# Patient Record
Sex: FEMALE | Race: WHITE | NOT HISPANIC OR LATINO | Employment: PART TIME | URBAN - METROPOLITAN AREA
[De-identification: names, ages, dates, MRNs, and addresses within clinical notes are randomized per-mention and may not be internally consistent; named-entity substitution may affect disease eponyms.]

---

## 2017-01-17 ENCOUNTER — RADIANT APPOINTMENT (OUTPATIENT)
Dept: GENERAL RADIOLOGY | Facility: CLINIC | Age: 67
End: 2017-01-17
Attending: PHYSICIAN ASSISTANT
Payer: COMMERCIAL

## 2017-01-17 ENCOUNTER — OFFICE VISIT (OUTPATIENT)
Dept: FAMILY MEDICINE | Facility: CLINIC | Age: 67
End: 2017-01-17
Payer: COMMERCIAL

## 2017-01-17 VITALS
HEIGHT: 67 IN | OXYGEN SATURATION: 97 % | HEART RATE: 91 BPM | TEMPERATURE: 98.4 F | SYSTOLIC BLOOD PRESSURE: 118 MMHG | BODY MASS INDEX: 21.82 KG/M2 | DIASTOLIC BLOOD PRESSURE: 64 MMHG | WEIGHT: 139 LBS

## 2017-01-17 DIAGNOSIS — M06.9 RHEUMATOID ARTHRITIS, UNSPECIFIED (H): ICD-10-CM

## 2017-01-17 DIAGNOSIS — R05.9 COUGH: ICD-10-CM

## 2017-01-17 DIAGNOSIS — J01.90 ACUTE SINUSITIS WITH SYMPTOMS > 10 DAYS: Primary | ICD-10-CM

## 2017-01-17 DIAGNOSIS — Z79.899 LONG TERM CURRENT USE OF IMMUNOSUPPRESSIVE DRUG: ICD-10-CM

## 2017-01-17 PROCEDURE — 99214 OFFICE O/P EST MOD 30 MIN: CPT | Performed by: PHYSICIAN ASSISTANT

## 2017-01-17 PROCEDURE — 71020 XR CHEST 2 VW: CPT

## 2017-01-17 NOTE — MR AVS SNAPSHOT
After Visit Summary   1/17/2017    Jannette Parra    MRN: [id number]           Patient Information     Date Of Birth          1950        Visit Information        Provider Department      1/17/2017 2:40 PM Anisa Mcgregor PA-C [facility]        Today's Diagnoses     Cough    -  1     Acute sinusitis with symptoms > 10 days           Care Instructions    Unclear exact origin and no evidence of pneumonia on exam.  Given duration of congestion sx and underlying immunosuppressive medications will treat with abx therapy.  Pt to review augmentin with rheumatology given potential interaction with methotrexate.  Re-check if not improving.    Electronically Signed By: Anisa Mcgregor PA-C          Follow-ups after your visit        Who to contact     If you have questions or need follow up information about today's clinic visit or your schedule please contact [facility] directly at [phone].  Normal or non-critical lab and imaging results will be communicated to you by MyChart, letter or phone within 4 business days after the clinic has received the results. If you do not hear from us within 7 days, please contact the clinic through [employer] or phone. If you have a critical or abnormal lab result, we will notify you by phone as soon as possible.  Submit refill requests through [employer] or call your pharmacy and they will forward the refill request to us. Please allow 3 business days for your refill to be completed.          Additional Information About Your Visit        MyChart Information     [employer] gives you secure access to your electronic health record. If you see a primary care provider, you can also send messages to your care team and make appointments. If you have questions, please call your primary care clinic.  If you do not have a primary care provider, please call [phone] and they will assist you.        Care EveryWhere ID     This is your Care  "EveryWhere ID. This could be used by other organizations to access your [facility] medical records  [id number]        Your Vitals Were     Pulse Temperature Height BMI (Body Mass Index) Pulse Oximetry Breastfeeding?    91 98.4  F (36.9  C) (Oral) 5' 7\" (1.702 m) 21.77 kg/m2 97% No       Blood Pressure from Last 3 Encounters:   01/17/17 118/64   09/27/16 132/78   02/03/15 106/57    Weight from Last 3 Encounters:   01/17/17 139 lb (63.05 kg)   09/27/16 136 lb (61.689 kg)   02/03/15 130 lb (58.968 kg)              We Performed the Following     XR Chest 2 Views          Today's Medication Changes          These changes are accurate as of: 1/17/17  3:27 PM.  If you have any questions, ask your nurse or doctor.               Start taking these medicines.        Dose/Directions    amoxicillin-clavulanate 875-125 MG per tablet   Commonly known as:  AUGMENTIN   Used for:  Acute sinusitis with symptoms > 10 days   Started by:  Anisa Mcgregor PA-C        Dose:  1 tablet   Take 1 tablet by mouth 2 times daily   Quantity:  20 tablet   Refills:  0         These medicines have changed or have updated prescriptions.        Dose/Directions    acetaminophen-codeine 300-30 MG per tablet   Commonly known as:  TYLENOL w/CODEINE No. 3   This may have changed:  Another medication with the same name was removed. Continue taking this medication, and follow the directions you see here.   Used for:  Back muscle spasm   Changed by:  Kalin Miller MD        One 3 times daily if needed for back pains   Quantity:  60 tablet   Refills:  0         Stop taking these medicines if you haven't already. Please contact your care team if you have questions.     clonazePAM 0.5 MG tablet   Commonly known as:  klonoPIN   Stopped by:  Anisa Mcgregor PA-C                Where to get your medicines      These medications were sent to [facility] [id number] [place] [address] " [place] [address]     Phone:  [phone]    - amoxicillin-clavulanate 875-125 MG per tablet             Primary Care Provider    [facility]       No address on file        Thank you!     Thank you for choosing [facility]  for your care. Our goal is always to provide you with excellent care. Hearing back from our patients is one way we can continue to improve our services. Please take a few minutes to complete the written survey that you may receive in the mail after your visit with us. Thank you!             Your Updated Medication List - Protect others around you: Learn how to safely use, store and throw away your medicines at www.disposemymeds.org.          This list is accurate as of: 1/17/17  3:27 PM.  Always use your most recent med list.                   Brand Name Dispense Instructions for use    acetaminophen-codeine 300-30 MG per tablet    TYLENOL w/CODEINE No. 3    60 tablet    One 3 times daily if needed for back pains       amoxicillin-clavulanate 875-125 MG per tablet    AUGMENTIN    20 tablet    Take 1 tablet by mouth 2 times daily       CALCIUM 500 + D 500-200 MG-IU Tabs      1 tablet daily       Cranberry 1000 MG Caps          denosumab 60 MG/ML Soln injection    PROLIA    1 mL    Inject 1 mL (60 mg) Subcutaneous once       DYAZIDE 37.5-25 MG per capsule   Generic drug:  triamterene-hydrochlorothiazide      1 CAP PO QD (Once per day)       fish oil-omega-3 fatty acids 1000 MG capsule      2 caps daily       folic acid 1 MG tablet    FOLVITE     1 TABLET DAILY       gabapentin 400 MG capsule    NEURONTIN    90 capsule    Take by mouth 2 times daily       ibuprofen 800 MG tablet    ADVIL/MOTRIN    90 tablet    Take 1 tablet by mouth every 8 hours as needed for pain.       METHOTREXATE (ARTHRITIS) TABS 2.5 MG OR      10 tablets weekly (25mg)       multivitamin per tablet      1 tablet daily       multivitamin Tabs tablet      1 TABLET DAILY       REMICADE 100 MG injection    Generic drug:  inFLIXimab     18.6 mL    Inject 185.1 mg into the vein once       TIZANIDINE HCL PO      Take 4 mg by mouth 2 times daily

## 2017-01-17 NOTE — PROGRESS NOTES
SUBJECTIVE:                                                    Jannette Parra is a 67 year old female who presents to clinic today for the following health issues:      Acute Illness   Acute illness concerns: runny nose, cough, low grade fever at onset, but not since, fatigue  Hx of RA and takes multiple immunosuppressive medications with remicade, methotrexate   Maybe a little HA that comes and goes, but not constant.  Maybe some sinus pressure with pushing, but not bad. No caryn face pain or teeth.  No painful breathing.   Cough productive, but feels its drainage from her nose.  Her rheumatologist did stop her remicade or methotrexate last week to see if this would help, but did take methrotrexate last night again.  Non-smoker. No hx of pneumonia.    Onset: for about three weeks     Fever: no    Chills/Sweats: no    Headache (location?): no    Sinus Pressure:no    Conjunctivitis:  no    Ear Pain: no    Rhinorrhea: YES    Congestion: YES    Sore Throat: no     Cough: YES    Wheeze: no    Decreased Appetite: no    Nausea: no    Vomiting: no    Diarrhea:  no    Dysuria/Freq.: no    Fatigue/Achiness: YES    Sick/Strep Exposure: no     Therapies Tried and outcome: OTC cough/cold meds, using neti-pot    Problem list and histories reviewed & adjusted, as indicated.  Additional history: as documented  Problem list, Medication list, Allergies, and Medical/Social/Surgical histories reviewed in [facility] and updated as appropriate.    Current Outpatient Prescriptions   Medication     TIZANIDINE HCL PO     amoxicillin-clavulanate (AUGMENTIN) 875-125 MG per tablet     gabapentin (NEURONTIN) 400 MG capsule     inFLIXimab (REMICADE) 100 MG injection     denosumab (PROLIA) 60 MG/ML SOLN     acetaminophen-codeine (TYLENOL W/CODEINE NO. 3) 300-30 MG per tablet     Cranberry 1000 MG CAPS     ibuprofen (ADVIL,MOTRIN) 800 MG tablet     FISH OIL 1000 MG OR CAPS     FOLIC ACID 1 MG OR TABS     METHOTREXATE (ARTHRITIS) TABS 2.5 MG OR      "DYAZIDE 37.5-25 MG OR CAPS     MULTIVITAMINS OR TABS     OCUVITE OR TABS     CALCIUM 500 + D 500-200 MG-IU OR TABS     No current facility-administered medications for this visit.     Allergies   Allergen Reactions     No Known Drug Allergies      O:  Blood pressure 118/64, pulse 91, temperature 98.4  F (36.9  C), temperature source Oral, height 5' 7\" (1.702 m), weight 139 lb (63.05 kg), SpO2 97 %, not currently breastfeeding.  Constitutional: Pt is a healthy appearing female, in no distress.  Eyes: Conjunctiva clear.  Ears: External ears and TMs normal BL.  Nose: Septum midline, nasal mucosa congested. Reports sinus pressure, but not significant tenderness to tapping.  Mouth / Throat: Normal dentition.  No oral lesions. Pharynx non erythematous, tonsils without hypertrophy.  Neck: Supple, no enlarged LN, trachea midline.  Heart: Normal S1 and S2, RRR, no appreciable murmurs, rubs, or gallops.  Lungs: CTA bilaterally with harsh breath sounds light expiratory rhonchi on the L only. No appreciable rales.    CXR personal reading shows no acute infiltrated. Does have barrel chest and hyperinflation, but pt was a non-smoker. Second - hand smoke exposure from parents until age 19, but not otherwise. Unclear if her osteoporosis makes this appear more marked too. Reviewed with her that this can be findings suggestive of COPD and we could consider further work-up with persistent breathing/cough complaints.    A/P:    ICD-10-CM    1. Acute sinusitis with symptoms > 10 days J01.90 amoxicillin-clavulanate (AUGMENTIN) 875-125 MG per tablet   2. Cough R05 XR Chest 2 Views   3. Rheumatoid arthritis, unspecified (H) M06.9    4. Long term current use of immunosuppressive drug Z79.899    ? Viral, but given duration of congestion and sinus pressure will tx for potential bacterial sinusitis.  Additionally hx complicated by use of immunosuppressive medications.  Pt in agreement with plan and will touch base with her rheumatologist " regarding discontinuing methotrexate given potential interaction with augmentin.  See pt instructions and notes in CXR personal reading above.    Patient Instructions   Unclear exact origin and no evidence of pneumonia on exam.  Given duration of congestion sx and underlying immunosuppressive medications will treat with abx therapy.  Pt to review augmentin with rheumatology given potential interaction with methotrexate.  Re-check if not improving.    Electronically Signed By: Anisa Mcgregor PA-C

## 2017-01-17 NOTE — PATIENT INSTRUCTIONS
Unclear exact origin and no evidence of pneumonia on exam.  Given duration of congestion sx and underlying immunosuppressive medications will treat with abx therapy.  Pt to review augmentin with rheumatology given potential interaction with methotrexate.  Re-check if not improving.    Electronically Signed By: Anisa Mcgregor PA-C

## 2017-01-17 NOTE — NURSING NOTE
"Chief Complaint   Patient presents with     URI    /64 mmHg  Pulse 91  Temp(Src) 98.4  F (36.9  C) (Oral)  Ht 5' 7\" (1.702 m)  Wt 139 lb (63.05 kg)  BMI 21.77 kg/m2  SpO2 97%  Breastfeeding? No Body Mass Index is Body mass index is 21.77 kg/(m^2).  BP completed using cuff size : regular right arm  Millicent Adams MA          "

## 2017-01-17 NOTE — Clinical Note
[facility]          [address] [place] [address]                                           [phone]  January 25, 2017     Jannette Parra  [address] [address]      Dear Jannette,    The results of your recent tests were reviewed and are as follows:    Final radiology report of chest x-ray was normal. Follow-up as previously discussed in clinic.    Enclosed is a copy of the results.     Thank you for choosing [facility].  We appreciate the opportunity to serve you and look forward to supporting your healthcare needs in the future.    If you have any questions or concerns, please call me or my staff at [phone].      Sincerely,    DELISA Argueta

## 2017-01-25 NOTE — PROGRESS NOTES
Quick Note:    Please call or write patient with the following results:    Final radiology report of chest x-ray was normal. Follow-up as previously discussed in clinic.    Electronically Signed By: Anisa Mcgregor PA-C    ______

## 2017-02-28 ENCOUNTER — TRANSFERRED RECORDS (OUTPATIENT)
Dept: HEALTH INFORMATION MANAGEMENT | Facility: CLINIC | Age: 67
End: 2017-02-28

## 2017-02-28 LAB
ALT SERPL-CCNC: 21 IU/L (ref 5–35)
AST SERPL-CCNC: 22 U/L (ref 5–34)
CREAT SERPL-MCNC: 0.62 MG/DL (ref 0.5–1.3)
GFR SERPL CREATININE-BSD FRML MDRD: 102.4 ML/MIN/1.73M2

## 2017-06-05 ENCOUNTER — TRANSFERRED RECORDS (OUTPATIENT)
Dept: HEALTH INFORMATION MANAGEMENT | Facility: CLINIC | Age: 67
End: 2017-06-05

## 2017-06-05 LAB
ALT SERPL-CCNC: 28 IU/L (ref 5–35)
AST SERPL-CCNC: 31 U/L (ref 5–34)
CREAT SERPL-MCNC: 0.75 MG/DL (ref 0.5–1.3)
GFR SERPL CREATININE-BSD FRML MDRD: 82.2 ML/MIN/1.73M2

## 2017-09-11 ENCOUNTER — TRANSFERRED RECORDS (OUTPATIENT)
Dept: HEALTH INFORMATION MANAGEMENT | Facility: CLINIC | Age: 67
End: 2017-09-11

## 2017-09-11 LAB
ALT SERPL-CCNC: 25 IU/L (ref 5–35)
AST SERPL-CCNC: 29 U/L (ref 5–34)
CREAT SERPL-MCNC: 0.52 MG/DL (ref 0.5–1.3)
GFR SERPL CREATININE-BSD FRML MDRD: 125.4 ML/MIN/1.73M2

## 2017-12-11 ENCOUNTER — TRANSFERRED RECORDS (OUTPATIENT)
Dept: HEALTH INFORMATION MANAGEMENT | Facility: CLINIC | Age: 67
End: 2017-12-11

## 2017-12-11 LAB
ALT SERPL-CCNC: 22 IU/L (ref 5–35)
AST SERPL-CCNC: 24 U/L (ref 5–34)
CREAT SERPL-MCNC: 0.58 MG/DL (ref 0.5–1.3)
GFR SERPL CREATININE-BSD FRML MDRD: 110.5 ML/MIN/1.73M2

## 2018-03-12 ENCOUNTER — TRANSFERRED RECORDS (OUTPATIENT)
Dept: HEALTH INFORMATION MANAGEMENT | Facility: CLINIC | Age: 68
End: 2018-03-12

## 2018-03-12 LAB
ALT SERPL-CCNC: 20 IU/L (ref 5–35)
AST SERPL-CCNC: 24 U/L (ref 5–34)
CREAT SERPL-MCNC: 0.61 MG/DL (ref 0.5–1.3)
GFR SERPL CREATININE-BSD FRML MDRD: 104 ML/MIN/1.73M2

## 2018-06-05 ENCOUNTER — TRANSFERRED RECORDS (OUTPATIENT)
Dept: HEALTH INFORMATION MANAGEMENT | Facility: CLINIC | Age: 68
End: 2018-06-05

## 2018-06-05 LAB
ALT SERPL-CCNC: 35 IU/L (ref 5–35)
AST SERPL-CCNC: 35 U/L (ref 5–34)
CREAT SERPL-MCNC: 0.55 MG/DL (ref 0.5–1.3)
GFR SERPL CREATININE-BSD FRML MDRD: 117.2 ML/MIN/1.73M2

## 2018-09-10 ENCOUNTER — TRANSFERRED RECORDS (OUTPATIENT)
Dept: HEALTH INFORMATION MANAGEMENT | Facility: CLINIC | Age: 68
End: 2018-09-10

## 2018-12-10 ENCOUNTER — TRANSFERRED RECORDS (OUTPATIENT)
Dept: HEALTH INFORMATION MANAGEMENT | Facility: CLINIC | Age: 68
End: 2018-12-10

## 2019-03-12 ENCOUNTER — TRANSFERRED RECORDS (OUTPATIENT)
Dept: HEALTH INFORMATION MANAGEMENT | Facility: CLINIC | Age: 69
End: 2019-03-12

## 2019-06-11 ENCOUNTER — TRANSFERRED RECORDS (OUTPATIENT)
Dept: HEALTH INFORMATION MANAGEMENT | Facility: CLINIC | Age: 69
End: 2019-06-11

## 2019-09-10 ENCOUNTER — TRANSFERRED RECORDS (OUTPATIENT)
Dept: HEALTH INFORMATION MANAGEMENT | Facility: CLINIC | Age: 69
End: 2019-09-10

## 2019-11-15 ENCOUNTER — TRANSFERRED RECORDS (OUTPATIENT)
Dept: HEALTH INFORMATION MANAGEMENT | Facility: CLINIC | Age: 69
End: 2019-11-15

## 2022-08-14 ENCOUNTER — APPOINTMENT (OUTPATIENT)
Dept: GENERAL RADIOLOGY | Facility: CLINIC | Age: 72
DRG: 481 | End: 2022-08-14
Attending: EMERGENCY MEDICINE
Payer: MEDICARE

## 2022-08-14 ENCOUNTER — HOSPITAL ENCOUNTER (INPATIENT)
Facility: CLINIC | Age: 72
LOS: 4 days | Discharge: NURSING, REHAB OR LONG-TERM CARE | DRG: 481 | End: 2022-08-18
Attending: EMERGENCY MEDICINE | Admitting: HOSPITALIST
Payer: MEDICARE

## 2022-08-14 DIAGNOSIS — S72.322A CLOSED DISPLACED TRANSVERSE FRACTURE OF SHAFT OF LEFT FEMUR, INITIAL ENCOUNTER (H): ICD-10-CM

## 2022-08-14 DIAGNOSIS — M06.9 RHEUMATOID ARTHRITIS, INVOLVING UNSPECIFIED SITE, UNSPECIFIED WHETHER RHEUMATOID FACTOR PRESENT (H): ICD-10-CM

## 2022-08-14 LAB
ABO/RH(D): NORMAL
ANION GAP SERPL CALCULATED.3IONS-SCNC: 8 MMOL/L (ref 7–15)
ANTIBODY SCREEN: NEGATIVE
BASOPHILS # BLD AUTO: 0 10E3/UL (ref 0–0.2)
BASOPHILS NFR BLD AUTO: 0 %
BUN SERPL-MCNC: 14.4 MG/DL (ref 8–23)
CALCIUM SERPL-MCNC: 10.5 MG/DL (ref 8.8–10.2)
CHLORIDE SERPL-SCNC: 105 MMOL/L (ref 98–107)
CREAT SERPL-MCNC: 0.62 MG/DL (ref 0.51–0.95)
DEPRECATED HCO3 PLAS-SCNC: 27 MMOL/L (ref 22–29)
EOSINOPHIL # BLD AUTO: 0.1 10E3/UL (ref 0–0.7)
EOSINOPHIL NFR BLD AUTO: 1 %
ERYTHROCYTE [DISTWIDTH] IN BLOOD BY AUTOMATED COUNT: 13.9 % (ref 10–15)
GFR SERPL CREATININE-BSD FRML MDRD: >90 ML/MIN/1.73M2
GLUCOSE SERPL-MCNC: 104 MG/DL (ref 70–99)
HCT VFR BLD AUTO: 37.6 % (ref 35–47)
HGB BLD-MCNC: 11.4 G/DL (ref 11.7–15.7)
IMM GRANULOCYTES # BLD: 0.1 10E3/UL
IMM GRANULOCYTES NFR BLD: 1 %
LYMPHOCYTES # BLD AUTO: 1.5 10E3/UL (ref 0.8–5.3)
LYMPHOCYTES NFR BLD AUTO: 16 %
MCH RBC QN AUTO: 30 PG (ref 26.5–33)
MCHC RBC AUTO-ENTMCNC: 30.3 G/DL (ref 31.5–36.5)
MCV RBC AUTO: 99 FL (ref 78–100)
MONOCYTES # BLD AUTO: 0.9 10E3/UL (ref 0–1.3)
MONOCYTES NFR BLD AUTO: 10 %
NEUTROPHILS # BLD AUTO: 6.8 10E3/UL (ref 1.6–8.3)
NEUTROPHILS NFR BLD AUTO: 72 %
NRBC # BLD AUTO: 0 10E3/UL
NRBC BLD AUTO-RTO: 0 /100
PLATELET # BLD AUTO: 254 10E3/UL (ref 150–450)
POTASSIUM SERPL-SCNC: 4.1 MMOL/L (ref 3.4–5.3)
RBC # BLD AUTO: 3.8 10E6/UL (ref 3.8–5.2)
SARS-COV-2 RNA RESP QL NAA+PROBE: NEGATIVE
SODIUM SERPL-SCNC: 140 MMOL/L (ref 136–145)
SPECIMEN EXPIRATION DATE: NORMAL
WBC # BLD AUTO: 9.4 10E3/UL (ref 4–11)

## 2022-08-14 PROCEDURE — 120N000001 HC R&B MED SURG/OB

## 2022-08-14 PROCEDURE — 96376 TX/PRO/DX INJ SAME DRUG ADON: CPT

## 2022-08-14 PROCEDURE — 72170 X-RAY EXAM OF PELVIS: CPT

## 2022-08-14 PROCEDURE — 99285 EMERGENCY DEPT VISIT HI MDM: CPT | Mod: 25

## 2022-08-14 PROCEDURE — 96375 TX/PRO/DX INJ NEW DRUG ADDON: CPT

## 2022-08-14 PROCEDURE — 85025 COMPLETE CBC W/AUTO DIFF WBC: CPT | Performed by: EMERGENCY MEDICINE

## 2022-08-14 PROCEDURE — 96361 HYDRATE IV INFUSION ADD-ON: CPT

## 2022-08-14 PROCEDURE — U0005 INFEC AGEN DETEC AMPLI PROBE: HCPCS | Performed by: EMERGENCY MEDICINE

## 2022-08-14 PROCEDURE — 250N000011 HC RX IP 250 OP 636: Performed by: EMERGENCY MEDICINE

## 2022-08-14 PROCEDURE — 258N000003 HC RX IP 258 OP 636: Performed by: HOSPITALIST

## 2022-08-14 PROCEDURE — 250N000009 HC RX 250: Performed by: EMERGENCY MEDICINE

## 2022-08-14 PROCEDURE — 86901 BLOOD TYPING SEROLOGIC RH(D): CPT | Performed by: EMERGENCY MEDICINE

## 2022-08-14 PROCEDURE — 250N000011 HC RX IP 250 OP 636: Performed by: HOSPITALIST

## 2022-08-14 PROCEDURE — 250N000013 HC RX MED GY IP 250 OP 250 PS 637: Performed by: HOSPITALIST

## 2022-08-14 PROCEDURE — 80048 BASIC METABOLIC PNL TOTAL CA: CPT | Performed by: EMERGENCY MEDICINE

## 2022-08-14 PROCEDURE — C9803 HOPD COVID-19 SPEC COLLECT: HCPCS

## 2022-08-14 PROCEDURE — 36415 COLL VENOUS BLD VENIPUNCTURE: CPT | Performed by: EMERGENCY MEDICINE

## 2022-08-14 PROCEDURE — 96374 THER/PROPH/DIAG INJ IV PUSH: CPT

## 2022-08-14 PROCEDURE — 73552 X-RAY EXAM OF FEMUR 2/>: CPT | Mod: LT

## 2022-08-14 PROCEDURE — 99223 1ST HOSP IP/OBS HIGH 75: CPT | Mod: AI | Performed by: HOSPITALIST

## 2022-08-14 RX ORDER — SULFASALAZINE 500 MG/1
1000 TABLET ORAL 2 TIMES DAILY
COMMUNITY

## 2022-08-14 RX ORDER — DULOXETIN HYDROCHLORIDE 60 MG/1
60 CAPSULE, DELAYED RELEASE ORAL EVERY EVENING
COMMUNITY

## 2022-08-14 RX ORDER — HYDROMORPHONE HYDROCHLORIDE 1 MG/ML
0.5 INJECTION, SOLUTION INTRAMUSCULAR; INTRAVENOUS; SUBCUTANEOUS
Status: DISCONTINUED | OUTPATIENT
Start: 2022-08-14 | End: 2022-08-18 | Stop reason: HOSPADM

## 2022-08-14 RX ORDER — NALOXONE HYDROCHLORIDE 0.4 MG/ML
0.4 INJECTION, SOLUTION INTRAMUSCULAR; INTRAVENOUS; SUBCUTANEOUS
Status: DISCONTINUED | OUTPATIENT
Start: 2022-08-14 | End: 2022-08-18 | Stop reason: HOSPADM

## 2022-08-14 RX ORDER — GABAPENTIN 300 MG/1
600 CAPSULE ORAL 2 TIMES DAILY
Status: DISCONTINUED | OUTPATIENT
Start: 2022-08-14 | End: 2022-08-15

## 2022-08-14 RX ORDER — ONDANSETRON 2 MG/ML
4 INJECTION INTRAMUSCULAR; INTRAVENOUS EVERY 6 HOURS PRN
Status: DISCONTINUED | OUTPATIENT
Start: 2022-08-14 | End: 2022-08-18 | Stop reason: HOSPADM

## 2022-08-14 RX ORDER — NALOXONE HYDROCHLORIDE 0.4 MG/ML
0.2 INJECTION, SOLUTION INTRAMUSCULAR; INTRAVENOUS; SUBCUTANEOUS
Status: DISCONTINUED | OUTPATIENT
Start: 2022-08-14 | End: 2022-08-18 | Stop reason: HOSPADM

## 2022-08-14 RX ORDER — LIDOCAINE 40 MG/G
CREAM TOPICAL
Status: DISCONTINUED | OUTPATIENT
Start: 2022-08-14 | End: 2022-08-18

## 2022-08-14 RX ORDER — HYDROMORPHONE HCL IN WATER/PF 6 MG/30 ML
0.2 PATIENT CONTROLLED ANALGESIA SYRINGE INTRAVENOUS
Status: DISCONTINUED | OUTPATIENT
Start: 2022-08-14 | End: 2022-08-18 | Stop reason: HOSPADM

## 2022-08-14 RX ORDER — FOLIC ACID 1 MG/1
1000 TABLET ORAL DAILY
Status: DISCONTINUED | OUTPATIENT
Start: 2022-08-14 | End: 2022-08-18 | Stop reason: HOSPADM

## 2022-08-14 RX ORDER — GABAPENTIN 300 MG/1
600 CAPSULE ORAL 2 TIMES DAILY
COMMUNITY

## 2022-08-14 RX ORDER — TRANEXAMIC ACID 10 MG/ML
1 INJECTION, SOLUTION INTRAVENOUS ONCE
Status: COMPLETED | OUTPATIENT
Start: 2022-08-14 | End: 2022-08-14

## 2022-08-14 RX ORDER — GABAPENTIN 300 MG/1
300 CAPSULE ORAL 2 TIMES DAILY
Status: DISCONTINUED | OUTPATIENT
Start: 2022-08-14 | End: 2022-08-14

## 2022-08-14 RX ORDER — MELOXICAM 15 MG/1
15 TABLET ORAL EVERY EVENING
Status: ON HOLD | COMMUNITY
End: 2022-08-18

## 2022-08-14 RX ORDER — HYDROCODONE BITARTRATE AND ACETAMINOPHEN 7.5; 325 MG/1; MG/1
1 TABLET ORAL EVERY 6 HOURS PRN
Status: DISCONTINUED | OUTPATIENT
Start: 2022-08-14 | End: 2022-08-18 | Stop reason: HOSPADM

## 2022-08-14 RX ORDER — FERROUS SULFATE 325(65) MG
325 TABLET, DELAYED RELEASE (ENTERIC COATED) ORAL EVERY OTHER DAY
COMMUNITY

## 2022-08-14 RX ORDER — SENNOSIDES 8.6 MG
1300 CAPSULE ORAL EVERY MORNING
COMMUNITY

## 2022-08-14 RX ORDER — ONDANSETRON 4 MG/1
4 TABLET, ORALLY DISINTEGRATING ORAL EVERY 6 HOURS PRN
Status: DISCONTINUED | OUTPATIENT
Start: 2022-08-14 | End: 2022-08-18 | Stop reason: HOSPADM

## 2022-08-14 RX ADMIN — HYDROMORPHONE HYDROCHLORIDE 0.5 MG: 1 INJECTION, SOLUTION INTRAMUSCULAR; INTRAVENOUS; SUBCUTANEOUS at 16:48

## 2022-08-14 RX ADMIN — FAMOTIDINE 20 MG: 10 INJECTION, SOLUTION INTRAVENOUS at 18:48

## 2022-08-14 RX ADMIN — Medication 1000 MCG: at 18:47

## 2022-08-14 RX ADMIN — TRANEXAMIC ACID 1 G: 10 INJECTION, SOLUTION INTRAVENOUS at 14:20

## 2022-08-14 RX ADMIN — HYDROMORPHONE HYDROCHLORIDE 0.2 MG: 0.2 INJECTION, SOLUTION INTRAMUSCULAR; INTRAVENOUS; SUBCUTANEOUS at 18:48

## 2022-08-14 RX ADMIN — DEXTROSE AND SODIUM CHLORIDE: 5; 900 INJECTION, SOLUTION INTRAVENOUS at 15:37

## 2022-08-14 RX ADMIN — HYDROMORPHONE HYDROCHLORIDE 0.5 MG: 1 INJECTION, SOLUTION INTRAMUSCULAR; INTRAVENOUS; SUBCUTANEOUS at 13:29

## 2022-08-14 RX ADMIN — GABAPENTIN 600 MG: 300 CAPSULE ORAL at 21:24

## 2022-08-14 RX ADMIN — HYDROCODONE BITARTRATE AND ACETAMINOPHEN 1 TABLET: 7.5; 325 TABLET ORAL at 21:33

## 2022-08-14 ASSESSMENT — ACTIVITIES OF DAILY LIVING (ADL)
ADLS_ACUITY_SCORE: 39
CHANGE_IN_FUNCTIONAL_STATUS_SINCE_ONSET_OF_CURRENT_ILLNESS/INJURY: NO
NUMBER_OF_TIMES_PATIENT_HAS_FALLEN_WITHIN_LAST_SIX_MONTHS: 1
CONCENTRATING,_REMEMBERING_OR_MAKING_DECISIONS_DIFFICULTY: NO
FALL_HISTORY_WITHIN_LAST_SIX_MONTHS: YES
ADLS_ACUITY_SCORE: 26
ADLS_ACUITY_SCORE: 39
DIFFICULTY_COMMUNICATING: NO
DOING_ERRANDS_INDEPENDENTLY_DIFFICULTY: NO
ADLS_ACUITY_SCORE: 26
WALKING_OR_CLIMBING_STAIRS_DIFFICULTY: NO
WEAR_GLASSES_OR_BLIND: NO
DIFFICULTY_EATING/SWALLOWING: NO
TOILETING_ISSUES: NO
ADLS_ACUITY_SCORE: 30
DRESSING/BATHING_DIFFICULTY: NO
ADLS_ACUITY_SCORE: 35

## 2022-08-14 ASSESSMENT — ENCOUNTER SYMPTOMS
ARTHRALGIAS: 1
HEADACHES: 0

## 2022-08-14 NOTE — ED NOTES
[facility]  ED Nurse Handoff Report    Jannette Parra is a 72 year old female   ED Chief complaint: Fall  . ED Diagnosis:   Final diagnoses:   Closed displaced transverse fracture of shaft of left femur, initial encounter (H)   Rheumatoid arthritis, involving unspecified site, unspecified whether rheumatoid factor present (H)     Allergies:   Allergies   Allergen Reactions     No Known Drug Allergies        Code Status: Full Code  Activity level - Baseline/Home:  Independent. Activity Level - Current:   Assist X 2. Lift room needed: No. Bariatric: No   Needed: No   Isolation: No. Infection: Not Applicable.     Vital Signs:   Vitals:    08/14/22 1200 08/14/22 1215 08/14/22 1230 08/14/22 1335   BP: (!) 142/71 (!) 123/90 118/75 100/66   Pulse:   73 82   Resp:    18   Temp:       SpO2: 100% 99% 96% 96%       Cardiac Rhythm:  ,      Pain level:    Patient confused: No. Patient Falls Risk: Yes.   Elimination Status: has not yet voided   Patient Report - Initial Complaint: fall, leg pain. Focused Assessment: Jannette Parra is a 72 year old female with history of RA and osteoporosis who presents via EMS with left leg pain followed by a mechanical fall. Per patient's report, she was in her garden, pulling some leaves and fell backward. She denies hitting her head or losing consciousness. No neck pain. No blood thinners. EMS notes a deformity mid shift of left femur, and placed her in traction. EMS gave her 15 mg ketamine. Her pain is at a 3/10 currently. She reports a history of RA and osteoporosis. She had a turkey sandwich at 9 am.    Tests Performed: labs, imaging. Abnormal Results:   XR Pelvis 1/2 Views   Final Result   IMPRESSION: No proximal left femoral fracture or pelvic fracture. Cannulated screw fixation across an old healed right femoral neck fracture. Traction device is in place.      XR Femur Left 2 Views   Final Result   IMPRESSION: Oblique fracture of the mid femoral diaphysis. There  is posterior displacement and proximal migration of the distal femur. No angulation. Traction device is in place.        Labs Ordered and Resulted from Time of ED Arrival to Time of ED Departure   BASIC METABOLIC PANEL - Abnormal       Result Value    Creatinine 0.62      Sodium 140      Potassium 4.1      Urea Nitrogen 14.4      Chloride 105      Carbon Dioxide (CO2) 27      Anion Gap 8      Glucose 104 (*)     GFR Estimate >90      Calcium 10.5 (*)    CBC WITH PLATELETS AND DIFFERENTIAL - Abnormal    WBC Count 9.4      RBC Count 3.80      Hemoglobin 11.4 (*)     Hematocrit 37.6      MCV 99      MCH 30.0      MCHC 30.3 (*)     RDW 13.9      Platelet Count 254      % Neutrophils 72      % Lymphocytes 16      % Monocytes 10      % Eosinophils 1      % Basophils 0      % Immature Granulocytes 1      NRBCs per 100 WBC 0      Absolute Neutrophils 6.8      Absolute Lymphocytes 1.5      Absolute Monocytes 0.9      Absolute Eosinophils 0.1      Absolute Basophils 0.0      Absolute Immature Granulocytes 0.1      Absolute NRBCs 0.0     COVID-19 VIRUS (CORONAVIRUS) BY PCR - Normal    SARS CoV2 PCR Negative     TYPE AND SCREEN, ADULT    ABO/RH(D) O POS      Antibody Screen Negative      SPECIMEN EXPIRATION DATE [id number]     ABO/RH TYPE AND SCREEN   .   Treatments provided: dilaudid. EMS administered ketamine  Family Comments: spouse in room  OBS brochure/video discussed/provided to patient:  N/A  ED Medications:   Medications   HYDROmorphone (PF) (DILAUDID) injection 0.5 mg (0.5 mg Intravenous Given 8/14/22 1329)     Drips infusing:  No  For the majority of the shift, the patient's behavior Green. Interventions performed were NA.    Sepsis treatment initiated: No     Patient tested for COVID 19 prior to admission: YES    ED Nurse Name/Phone Number: Sharon Lord RN,   1:37 PM  RECEIVING UNIT ED HANDOFF REVIEW    Above ED Nurse Handoff Report was reviewed: Yes  Reviewed by: Chrissy Good RN on August 14, 2022 at  4:30 PM

## 2022-08-14 NOTE — PROGRESS NOTES
Ortho Brief    71 yo F with displaced L midshaft femur fracture    NPO p [place]  Plan OR for IMN Monday AM
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
## 2022-08-14 NOTE — PHARMACY-ADMISSION MEDICATION HISTORY
Admission medication history interview status for this patient is complete. See [facility] admission navigator for allergy information, prior to admission medications and immunization status.     Medication history interview done, indicate source(s): Patient  Medication history resources (including written lists, pill bottles, clinic record):None    Changes made to PTA medication list:  Added: sulfasalazine, meloxicam, Areds, arthritis Tylenol, duloxetine, Reclast, ferrous sulfate  Changed: gabapentin  Reported as Not Taking: none  Removed: acetaminophen-codeine, Augmentin, calcium with vitamin D, Prolia, Dyazide, ibuprofen, Remicade, ocuvite, tizanidine    Actions taken by pharmacist (provider contacted, etc):Left note to provider that med rec is complete.      Additional medication history information:None    Medication reconciliation/reorder completed by provider prior to medication history?  Y   (Y/N)       Prior to Admission medications    Medication Sig Last Dose Taking? Auth Provider Long Term End Date   acetaminophen (TYLENOL 8 HOUR ARTHRITIS PAIN) 650 MG CR tablet Take 1,300 mg by mouth every morning 8/14/2022 at am Yes Unknown, Entered By History     CRANBERRY PO Take 1 tablet by mouth every evening 8/13/2022 at pm Yes Reported, Patient     DULoxetine (CYMBALTA) 60 MG capsule Take 60 mg by mouth every evening 8/13/2022 at pm Yes Unknown, Entered By History Yes    ferrous sulfate (FE TABS) 325 (65 Fe) MG EC tablet Take 325 mg by mouth every other day Take in the evening 8/12/2022 at pm Yes Unknown, Entered By History     FOLIC ACID 1 MG OR TABS Take 1 mg by mouth every evening 8/13/2022 at pm Yes Reported, Patient     gabapentin (NEURONTIN) 300 MG capsule Take 600 mg by mouth 2 times daily 8/14/2022 at am Yes Unknown, Entered By History No    meloxicam (MOBIC) 15 MG tablet Take 15 mg by mouth every evening 8/13/2022 at pm Yes Unknown, Entered By History Yes    METHOTREXATE (ARTHRITIS) TABS 2.5 MG OR Take 25 mg by  mouth once a week Take on Monday 8/8/2022 Yes      Multiple Vitamins-Minerals (ICAPS AREDS FORMULA PO) Take 1 capsule by mouth 2 times daily 8/14/2022 at am Yes Unknown, Entered By History     MULTIVITAMINS OR TABS Take 1 tablet by mouth daily 8/14/2022 at am Yes      Omega-3 Fatty Acids (FISH OIL PO) Take 1 capsule by mouth 2 times daily 8/14/2022 at am Yes Reported, Patient     sulfaSALAzine (AZULFIDINE) 500 MG tablet Take 1,000 mg by mouth 2 times daily 8/14/2022 at am Yes Unknown, Entered By History     Zoledronic Acid (RECLAST IV) Receive two times a year June 2022 Yes Unknown, Entered By History

## 2022-08-14 NOTE — ED TRIAGE NOTES
Pt was working in there garden, tripping and fell backwards.  No loc per EMS pt has a deformity mid shift of left femur.  She was placed in traction. She received 15 mg of Ketamine for pain.     Triage Assessment     Row Name 08/14/22 1159       Triage Assessment (Adult)    Airway WDL WDL       Respiratory WDL    Respiratory WDL WDL

## 2022-08-14 NOTE — ED NOTES
Bed: ED27  Expected date: 8/14/22  Expected time: 11:40 AM  Means of arrival: Ambulance  Comments:  [address]-  73 yo F- femur fx

## 2022-08-14 NOTE — ED PROVIDER NOTES
History   Chief Complaint:  Fall       HPI   Jannette Parra is a 72 year old female with history of RA and osteoporosis who presents via EMS with left leg pain followed by a mechanical fall. Per patient's report, she was in her garden, pulling some leaves and fell backward. She denies hitting her head or losing consciousness. No neck pain. No blood thinners. EMS notes a deformity mid shift of left femur, and placed her in traction. EMS gave her 15 mg ketamine. Her pain is at a 3/10 currently. She reports a history of RA and osteoporosis. She had a turkey sandwich at 9 am.     Review of Systems   Musculoskeletal: Positive for arthralgias.   Neurological: Negative for syncope and headaches.   All other systems reviewed and are negative.    Allergies:  No Known Drug Allergies    Medications:  amoxicillin-clavulanate (AUGMENTIN) 875-125 MG per tablet  CALCIUM 500 + D 500-200 MG-IU OR TABS  denosumab (PROLIA) 60 MG/ML SOLN  DYAZIDE 37.5-25 MG OR CAPS  gabapentin (NEURONTIN) 400 MG capsule  inFLIXimab (REMICADE) 100 MG injection  METHOTREXATE (ARTHRITIS) TABS 2.5 MG OR  OCUVITE OR TABS  TIZANIDINE HCL PO    Past Medical History:     RA  Osteoporosis  Lumbago  Chronic back pain  Stress fracture of left foot  Instability of right foot joint    Past Surgical History:    Colonoscopy   Tubal ligation  Right hip nailing    Family History:    Father: congenital defect  Sister: diabetes  Brother: diabetes    Social History:  Presents alone via EMS.  PCP: Hellen Cornelius MD      Physical Exam     Patient Vitals for the past 24 hrs:   BP Temp Pulse Resp SpO2   08/14/22 1335 100/66 -- 82 18 96 %   08/14/22 1230 118/75 -- 73 -- 96 %   08/14/22 1215 (!) 123/90 -- -- -- 99 %   08/14/22 1200 (!) 142/71 -- -- -- 100 %   08/14/22 1157 (!) 142/71 98.3  F (36.8  C) 80 16 99 %       Physical Exam  Constitutional: Alert, attentive, GCS 15   HENT:    Nose: Nose normal.    Mouth/Throat: Oropharynx is clear, mucous membranes are moist   Neck:  No midline tenderness, full range of motion  Head: No occipital or scalp trauma.  Eyes: EOM are normal, anicteric, conjugate gaze  CV: regular rate and rhythm; no murmurs  Chest: Effort normal and breath sounds clear without wheezing or rales, symmetric bilaterally   GI:  non tender. No distension. No guarding or rebound.    MSK: Left leg in hare traction, distal upper leg tenderness, no overt knee tenderness, good distal cap refill and DP/PT pulses, distal sensation intact.  Pelvis is stable, no other extremity tenderness, no back tenderness  Neurological: Alert, attentive, moving all extremities equally.   Skin: Skin is warm and dry.      Emergency Department Course     Imaging:  XR Pelvis 1/2 Views   Final Result   IMPRESSION: No proximal left femoral fracture or pelvic fracture. Cannulated screw fixation across an old healed right femoral neck fracture. Traction device is in place.      XR Femur Left 2 Views   Final Result   IMPRESSION: Oblique fracture of the mid femoral diaphysis. There is posterior displacement and proximal migration of the distal femur. No angulation. Traction device is in place.        Report per radiology    Laboratory   Labs Ordered and Resulted from Time of ED Arrival to Time of ED Departure   BASIC METABOLIC PANEL - Abnormal       Result Value    Creatinine 0.62      Sodium 140      Potassium 4.1      Urea Nitrogen 14.4      Chloride 105      Carbon Dioxide (CO2) 27      Anion Gap 8      Glucose 104 (*)     GFR Estimate >90      Calcium 10.5 (*)    CBC WITH PLATELETS AND DIFFERENTIAL - Abnormal    WBC Count 9.4      RBC Count 3.80      Hemoglobin 11.4 (*)     Hematocrit 37.6      MCV 99      MCH 30.0      MCHC 30.3 (*)     RDW 13.9      Platelet Count 254      % Neutrophils 72      % Lymphocytes 16      % Monocytes 10      % Eosinophils 1      % Basophils 0      % Immature Granulocytes 1      NRBCs per 100 WBC 0      Absolute Neutrophils 6.8      Absolute Lymphocytes 1.5      Absolute  Monocytes 0.9      Absolute Eosinophils 0.1      Absolute Basophils 0.0      Absolute Immature Granulocytes 0.1      Absolute NRBCs 0.0     COVID-19 VIRUS (CORONAVIRUS) BY PCR - Normal    SARS CoV2 PCR Negative     TYPE AND SCREEN, ADULT    ABO/RH(D) O POS      Antibody Screen Negative      SPECIMEN EXPIRATION DATE [id number]     ABO/RH TYPE AND SCREEN     Emergency Department Course:  Reviewed:  I reviewed nursing notes, vitals, past medical history and Care Everywhere    Assessments:  1200 I obtained history and examined the patient as noted above.   1345 I rechecked the patient and explained findings.     Consults:  1313 I paged ortho.  1331 I spoke with Dr. Jack of the hospitalist service regarding patient's presentation, findings, and plan of care.  1349 I spoke to Dr. Dr. Schmitz in ortho.    Interventions:  Medications   HYDROmorphone (PF) (DILAUDID) injection 0.5 mg (0.5 mg Intravenous Given 22 1329)   tranexamic acid 1 g in 100 mL NS IV bag (premix) (has no administration in time range)       Disposition:  The patient was admitted to the hospital under the care of Dr. Jack.     Impression & Plan   Medical Decision Makin-year-old woman past medical history sent for rheumatoid arthritis on Remicade presenting for left upper leg deformity sustained when she stepped backwards and her garden and fell.  She did not hit her head, denies any neck pain.  Medics reported obvious deformity for which they put her in a hare traction, on arrival her pain is significantly improved after ketamine, she has good distal neurovascular exam with good DP and PT pulses.  She denies hitting her head, she has no neck pain, has no other extremity pain and pelvis is stable.  X-rays of the left femur and pelvis show mid-shaft L femur fracture.  Hare traction was removed, leg was held in place by multiple blankets, I did touch base with orthopedics, they recommended admission to medicine for preoperative  clearance and likely fixation in the morning, 1g TXa given per their recommendations. Ok to leave in hare traction given she is tolerating it well.     Diagnosis:    ICD-10-CM    1. Closed displaced transverse fracture of shaft of left femur, initial encounter (H)  S72.322A    2. Rheumatoid arthritis, involving unspecified site, unspecified whether rheumatoid factor present (H)  M06.9        Ok Medrano MD  [employer]  1:21 PM 08/14/22     Scribe Disclosure:  I, Christiano Mott, am serving as a scribe at 12:00 PM on 8/14/2022 to document services personally performed by Ok Medrano MD based on my observations and the provider's statements to me.        Ok Medrano MD  08/14/22 [id number]

## 2022-08-15 ENCOUNTER — ANESTHESIA EVENT (OUTPATIENT)
Dept: SURGERY | Facility: CLINIC | Age: 72
DRG: 481 | End: 2022-08-15
Payer: MEDICARE

## 2022-08-15 ENCOUNTER — ANESTHESIA (OUTPATIENT)
Dept: SURGERY | Facility: CLINIC | Age: 72
DRG: 481 | End: 2022-08-15
Payer: MEDICARE

## 2022-08-15 ENCOUNTER — APPOINTMENT (OUTPATIENT)
Dept: GENERAL RADIOLOGY | Facility: CLINIC | Age: 72
DRG: 481 | End: 2022-08-15
Attending: ORTHOPAEDIC SURGERY
Payer: MEDICARE

## 2022-08-15 LAB
ANION GAP SERPL CALCULATED.3IONS-SCNC: 5 MMOL/L (ref 7–15)
BASOPHILS # BLD AUTO: 0 10E3/UL (ref 0–0.2)
BASOPHILS NFR BLD AUTO: 1 %
BUN SERPL-MCNC: 9.3 MG/DL (ref 8–23)
CALCIUM SERPL-MCNC: 9.2 MG/DL (ref 8.8–10.2)
CHLORIDE SERPL-SCNC: 106 MMOL/L (ref 98–107)
CREAT SERPL-MCNC: 0.52 MG/DL (ref 0.51–0.95)
DEPRECATED HCO3 PLAS-SCNC: 27 MMOL/L (ref 22–29)
EOSINOPHIL # BLD AUTO: 0.1 10E3/UL (ref 0–0.7)
EOSINOPHIL NFR BLD AUTO: 2 %
ERYTHROCYTE [DISTWIDTH] IN BLOOD BY AUTOMATED COUNT: 14 % (ref 10–15)
GFR SERPL CREATININE-BSD FRML MDRD: >90 ML/MIN/1.73M2
GLUCOSE SERPL-MCNC: 103 MG/DL (ref 70–99)
HCT VFR BLD AUTO: 32.4 % (ref 35–47)
HGB BLD-MCNC: 9.7 G/DL (ref 11.7–15.7)
IMM GRANULOCYTES # BLD: 0 10E3/UL
IMM GRANULOCYTES NFR BLD: 0 %
INR PPP: 1 (ref 0.85–1.15)
LYMPHOCYTES # BLD AUTO: 1.2 10E3/UL (ref 0.8–5.3)
LYMPHOCYTES NFR BLD AUTO: 18 %
MCH RBC QN AUTO: 30.3 PG (ref 26.5–33)
MCHC RBC AUTO-ENTMCNC: 29.9 G/DL (ref 31.5–36.5)
MCV RBC AUTO: 101 FL (ref 78–100)
MONOCYTES # BLD AUTO: 0.8 10E3/UL (ref 0–1.3)
MONOCYTES NFR BLD AUTO: 13 %
NEUTROPHILS # BLD AUTO: 4.2 10E3/UL (ref 1.6–8.3)
NEUTROPHILS NFR BLD AUTO: 66 %
NRBC # BLD AUTO: 0 10E3/UL
NRBC BLD AUTO-RTO: 0 /100
PLATELET # BLD AUTO: 218 10E3/UL (ref 150–450)
POTASSIUM SERPL-SCNC: 4.2 MMOL/L (ref 3.4–5.3)
RBC # BLD AUTO: 3.2 10E6/UL (ref 3.8–5.2)
SODIUM SERPL-SCNC: 138 MMOL/L (ref 136–145)
WBC # BLD AUTO: 6.4 10E3/UL (ref 4–11)

## 2022-08-15 PROCEDURE — C1769 GUIDE WIRE: HCPCS | Performed by: ORTHOPAEDIC SURGERY

## 2022-08-15 PROCEDURE — 250N000011 HC RX IP 250 OP 636: Performed by: PHYSICIAN ASSISTANT

## 2022-08-15 PROCEDURE — 250N000009 HC RX 250: Performed by: ORTHOPAEDIC SURGERY

## 2022-08-15 PROCEDURE — 370N000017 HC ANESTHESIA TECHNICAL FEE, PER MIN: Performed by: ORTHOPAEDIC SURGERY

## 2022-08-15 PROCEDURE — 258N000003 HC RX IP 258 OP 636: Performed by: PHYSICIAN ASSISTANT

## 2022-08-15 PROCEDURE — 250N000013 HC RX MED GY IP 250 OP 250 PS 637: Performed by: PHYSICIAN ASSISTANT

## 2022-08-15 PROCEDURE — 250N000009 HC RX 250: Performed by: NURSE ANESTHETIST, CERTIFIED REGISTERED

## 2022-08-15 PROCEDURE — 36415 COLL VENOUS BLD VENIPUNCTURE: CPT | Performed by: HOSPITALIST

## 2022-08-15 PROCEDURE — 85610 PROTHROMBIN TIME: CPT | Performed by: HOSPITALIST

## 2022-08-15 PROCEDURE — 250N000013 HC RX MED GY IP 250 OP 250 PS 637: Performed by: INTERNAL MEDICINE

## 2022-08-15 PROCEDURE — 0QS936Z REPOSITION LEFT FEMORAL SHAFT WITH INTRAMEDULLARY INTERNAL FIXATION DEVICE, PERCUTANEOUS APPROACH: ICD-10-PCS | Performed by: ORTHOPAEDIC SURGERY

## 2022-08-15 PROCEDURE — 82310 ASSAY OF CALCIUM: CPT | Performed by: HOSPITALIST

## 2022-08-15 PROCEDURE — 250N000011 HC RX IP 250 OP 636: Performed by: HOSPITALIST

## 2022-08-15 PROCEDURE — 250N000011 HC RX IP 250 OP 636: Performed by: NURSE ANESTHETIST, CERTIFIED REGISTERED

## 2022-08-15 PROCEDURE — C1713 ANCHOR/SCREW BN/BN,TIS/BN: HCPCS | Performed by: ORTHOPAEDIC SURGERY

## 2022-08-15 PROCEDURE — 85025 COMPLETE CBC W/AUTO DIFF WBC: CPT | Performed by: HOSPITALIST

## 2022-08-15 PROCEDURE — 258N000003 HC RX IP 258 OP 636: Performed by: NURSE ANESTHETIST, CERTIFIED REGISTERED

## 2022-08-15 PROCEDURE — 360N000084 HC SURGERY LEVEL 4 W/ FLUORO, PER MIN: Performed by: ORTHOPAEDIC SURGERY

## 2022-08-15 PROCEDURE — 999N000179 XR SURGERY CARM FLUORO LESS THAN 5 MIN W STILLS: Mod: TC

## 2022-08-15 PROCEDURE — 272N000001 HC OR GENERAL SUPPLY STERILE: Performed by: ORTHOPAEDIC SURGERY

## 2022-08-15 PROCEDURE — 999N000141 HC STATISTIC PRE-PROCEDURE NURSING ASSESSMENT: Performed by: ORTHOPAEDIC SURGERY

## 2022-08-15 PROCEDURE — 710N000009 HC RECOVERY PHASE 1, LEVEL 1, PER MIN: Performed by: ORTHOPAEDIC SURGERY

## 2022-08-15 PROCEDURE — 120N000001 HC R&B MED SURG/OB

## 2022-08-15 PROCEDURE — 258N000003 HC RX IP 258 OP 636: Performed by: HOSPITALIST

## 2022-08-15 PROCEDURE — 99231 SBSQ HOSP IP/OBS SF/LOW 25: CPT | Performed by: INTERNAL MEDICINE

## 2022-08-15 PROCEDURE — 258N000003 HC RX IP 258 OP 636: Performed by: ANESTHESIOLOGY

## 2022-08-15 PROCEDURE — 250N000011 HC RX IP 250 OP 636: Performed by: ANESTHESIOLOGY

## 2022-08-15 DEVICE — IMPLANTABLE DEVICE: Type: IMPLANTABLE DEVICE | Site: LEG | Status: FUNCTIONAL

## 2022-08-15 DEVICE — IMP SCR SYN TFNA FENESTRATED LAG 105MM 04.038.205S: Type: IMPLANTABLE DEVICE | Site: LEG | Status: FUNCTIONAL

## 2022-08-15 DEVICE — IMP SCR SYN 5.0 TI LOCK T25 STARDRIVE 50MM 04.005.540S: Type: IMPLANTABLE DEVICE | Site: LEG | Status: FUNCTIONAL

## 2022-08-15 RX ORDER — MEPERIDINE HYDROCHLORIDE 25 MG/ML
12.5 INJECTION INTRAMUSCULAR; INTRAVENOUS; SUBCUTANEOUS
Status: COMPLETED | OUTPATIENT
Start: 2022-08-15 | End: 2022-08-15

## 2022-08-15 RX ORDER — LIDOCAINE 40 MG/G
CREAM TOPICAL
Status: DISCONTINUED | OUTPATIENT
Start: 2022-08-15 | End: 2022-08-18 | Stop reason: HOSPADM

## 2022-08-15 RX ORDER — CEFAZOLIN SODIUM/WATER 2 G/20 ML
2 SYRINGE (ML) INTRAVENOUS
Status: DISCONTINUED | OUTPATIENT
Start: 2022-08-15 | End: 2022-08-15 | Stop reason: HOSPADM

## 2022-08-15 RX ORDER — DEXAMETHASONE SODIUM PHOSPHATE 4 MG/ML
INJECTION, SOLUTION INTRA-ARTICULAR; INTRALESIONAL; INTRAMUSCULAR; INTRAVENOUS; SOFT TISSUE PRN
Status: DISCONTINUED | OUTPATIENT
Start: 2022-08-15 | End: 2022-08-15

## 2022-08-15 RX ORDER — TRANEXAMIC ACID 650 MG/1
1950 TABLET ORAL ONCE
Status: DISCONTINUED | OUTPATIENT
Start: 2022-08-15 | End: 2022-08-15 | Stop reason: HOSPADM

## 2022-08-15 RX ORDER — HYDROMORPHONE HCL IN WATER/PF 6 MG/30 ML
0.2 PATIENT CONTROLLED ANALGESIA SYRINGE INTRAVENOUS EVERY 5 MIN PRN
Status: DISCONTINUED | OUTPATIENT
Start: 2022-08-15 | End: 2022-08-15 | Stop reason: HOSPADM

## 2022-08-15 RX ORDER — LIDOCAINE 40 MG/G
CREAM TOPICAL
Status: DISCONTINUED | OUTPATIENT
Start: 2022-08-15 | End: 2022-08-15 | Stop reason: HOSPADM

## 2022-08-15 RX ORDER — ONDANSETRON 4 MG/1
4 TABLET, ORALLY DISINTEGRATING ORAL EVERY 30 MIN PRN
Status: DISCONTINUED | OUTPATIENT
Start: 2022-08-15 | End: 2022-08-15 | Stop reason: HOSPADM

## 2022-08-15 RX ORDER — ONDANSETRON 2 MG/ML
INJECTION INTRAMUSCULAR; INTRAVENOUS PRN
Status: DISCONTINUED | OUTPATIENT
Start: 2022-08-15 | End: 2022-08-15

## 2022-08-15 RX ORDER — OXYCODONE HYDROCHLORIDE 5 MG/1
5 TABLET ORAL EVERY 4 HOURS PRN
Status: DISCONTINUED | OUTPATIENT
Start: 2022-08-15 | End: 2022-08-15 | Stop reason: HOSPADM

## 2022-08-15 RX ORDER — ONDANSETRON 2 MG/ML
4 INJECTION INTRAMUSCULAR; INTRAVENOUS EVERY 30 MIN PRN
Status: DISCONTINUED | OUTPATIENT
Start: 2022-08-15 | End: 2022-08-15 | Stop reason: HOSPADM

## 2022-08-15 RX ORDER — CEFAZOLIN SODIUM 1 G/3ML
1 INJECTION, POWDER, FOR SOLUTION INTRAMUSCULAR; INTRAVENOUS EVERY 8 HOURS
Status: COMPLETED | OUTPATIENT
Start: 2022-08-15 | End: 2022-08-16

## 2022-08-15 RX ORDER — GABAPENTIN 300 MG/1
600 CAPSULE ORAL 2 TIMES DAILY
Status: DISCONTINUED | OUTPATIENT
Start: 2022-08-15 | End: 2022-08-18 | Stop reason: HOSPADM

## 2022-08-15 RX ORDER — HYDRALAZINE HYDROCHLORIDE 20 MG/ML
2.5-5 INJECTION INTRAMUSCULAR; INTRAVENOUS EVERY 10 MIN PRN
Status: DISCONTINUED | OUTPATIENT
Start: 2022-08-15 | End: 2022-08-15 | Stop reason: HOSPADM

## 2022-08-15 RX ORDER — FENTANYL CITRATE 50 UG/ML
25 INJECTION, SOLUTION INTRAMUSCULAR; INTRAVENOUS EVERY 5 MIN PRN
Status: DISCONTINUED | OUTPATIENT
Start: 2022-08-15 | End: 2022-08-15 | Stop reason: HOSPADM

## 2022-08-15 RX ORDER — KETOROLAC TROMETHAMINE 15 MG/ML
15 INJECTION, SOLUTION INTRAMUSCULAR; INTRAVENOUS EVERY 6 HOURS PRN
Status: DISCONTINUED | OUTPATIENT
Start: 2022-08-15 | End: 2022-08-15 | Stop reason: HOSPADM

## 2022-08-15 RX ORDER — PHENYLEPHRINE HYDROCHLORIDE 10 MG/ML
INJECTION INTRAVENOUS PRN
Status: DISCONTINUED | OUTPATIENT
Start: 2022-08-15 | End: 2022-08-15

## 2022-08-15 RX ORDER — SODIUM CHLORIDE, SODIUM LACTATE, POTASSIUM CHLORIDE, CALCIUM CHLORIDE 600; 310; 30; 20 MG/100ML; MG/100ML; MG/100ML; MG/100ML
INJECTION, SOLUTION INTRAVENOUS CONTINUOUS
Status: DISCONTINUED | OUTPATIENT
Start: 2022-08-15 | End: 2022-08-15 | Stop reason: HOSPADM

## 2022-08-15 RX ORDER — GLYCOPYRROLATE 0.2 MG/ML
INJECTION, SOLUTION INTRAMUSCULAR; INTRAVENOUS PRN
Status: DISCONTINUED | OUTPATIENT
Start: 2022-08-15 | End: 2022-08-15

## 2022-08-15 RX ORDER — DULOXETIN HYDROCHLORIDE 60 MG/1
60 CAPSULE, DELAYED RELEASE ORAL EVERY EVENING
Status: DISCONTINUED | OUTPATIENT
Start: 2022-08-15 | End: 2022-08-18 | Stop reason: HOSPADM

## 2022-08-15 RX ORDER — LABETALOL HYDROCHLORIDE 5 MG/ML
10 INJECTION, SOLUTION INTRAVENOUS
Status: DISCONTINUED | OUTPATIENT
Start: 2022-08-15 | End: 2022-08-15 | Stop reason: HOSPADM

## 2022-08-15 RX ORDER — LIDOCAINE HYDROCHLORIDE 10 MG/ML
INJECTION, SOLUTION INFILTRATION; PERINEURAL PRN
Status: DISCONTINUED | OUTPATIENT
Start: 2022-08-15 | End: 2022-08-15

## 2022-08-15 RX ORDER — FENTANYL CITRATE 50 UG/ML
INJECTION, SOLUTION INTRAMUSCULAR; INTRAVENOUS PRN
Status: DISCONTINUED | OUTPATIENT
Start: 2022-08-15 | End: 2022-08-15

## 2022-08-15 RX ORDER — BUPIVACAINE HYDROCHLORIDE AND EPINEPHRINE 5; 5 MG/ML; UG/ML
INJECTION, SOLUTION PERINEURAL PRN
Status: DISCONTINUED | OUTPATIENT
Start: 2022-08-15 | End: 2022-08-15 | Stop reason: HOSPADM

## 2022-08-15 RX ORDER — FENTANYL CITRATE 50 UG/ML
25 INJECTION, SOLUTION INTRAMUSCULAR; INTRAVENOUS
Status: CANCELLED | OUTPATIENT
Start: 2022-08-15

## 2022-08-15 RX ORDER — CEFAZOLIN SODIUM/WATER 2 G/20 ML
2 SYRINGE (ML) INTRAVENOUS SEE ADMIN INSTRUCTIONS
Status: DISCONTINUED | OUTPATIENT
Start: 2022-08-15 | End: 2022-08-15 | Stop reason: HOSPADM

## 2022-08-15 RX ORDER — PROPOFOL 10 MG/ML
INJECTION, EMULSION INTRAVENOUS PRN
Status: DISCONTINUED | OUTPATIENT
Start: 2022-08-15 | End: 2022-08-15

## 2022-08-15 RX ADMIN — DEXTROSE AND SODIUM CHLORIDE: 5; 900 INJECTION, SOLUTION INTRAVENOUS at 04:36

## 2022-08-15 RX ADMIN — GABAPENTIN 600 MG: 300 CAPSULE ORAL at 11:37

## 2022-08-15 RX ADMIN — DEXTROSE AND SODIUM CHLORIDE: 5; 900 INJECTION, SOLUTION INTRAVENOUS at 11:37

## 2022-08-15 RX ADMIN — MEPERIDINE HYDROCHLORIDE 12.5 MG: 25 INJECTION INTRAMUSCULAR; INTRAVENOUS; SUBCUTANEOUS at 09:36

## 2022-08-15 RX ADMIN — PHENYLEPHRINE HYDROCHLORIDE 100 MCG: 10 INJECTION INTRAVENOUS at 07:55

## 2022-08-15 RX ADMIN — FAMOTIDINE 20 MG: 10 INJECTION, SOLUTION INTRAVENOUS at 04:36

## 2022-08-15 RX ADMIN — GLYCOPYRROLATE 0.2 MG: 0.2 INJECTION, SOLUTION INTRAMUSCULAR; INTRAVENOUS at 07:41

## 2022-08-15 RX ADMIN — CEFAZOLIN 1 G: 1 INJECTION, POWDER, FOR SOLUTION INTRAMUSCULAR; INTRAVENOUS at 16:40

## 2022-08-15 RX ADMIN — MEPERIDINE HYDROCHLORIDE 12.5 MG: 25 INJECTION INTRAMUSCULAR; INTRAVENOUS; SUBCUTANEOUS at 09:17

## 2022-08-15 RX ADMIN — GABAPENTIN 600 MG: 300 CAPSULE ORAL at 20:19

## 2022-08-15 RX ADMIN — PHENYLEPHRINE HYDROCHLORIDE 200 MCG: 10 INJECTION INTRAVENOUS at 07:38

## 2022-08-15 RX ADMIN — DEXAMETHASONE SODIUM PHOSPHATE 4 MG: 4 INJECTION, SOLUTION INTRA-ARTICULAR; INTRALESIONAL; INTRAMUSCULAR; INTRAVENOUS; SOFT TISSUE at 07:41

## 2022-08-15 RX ADMIN — SODIUM CHLORIDE, POTASSIUM CHLORIDE, SODIUM LACTATE AND CALCIUM CHLORIDE: 600; 310; 30; 20 INJECTION, SOLUTION INTRAVENOUS at 07:31

## 2022-08-15 RX ADMIN — PHENYLEPHRINE HYDROCHLORIDE 200 MCG: 10 INJECTION INTRAVENOUS at 08:04

## 2022-08-15 RX ADMIN — LIDOCAINE HYDROCHLORIDE 50 MG: 10 INJECTION, SOLUTION INFILTRATION; PERINEURAL at 07:41

## 2022-08-15 RX ADMIN — Medication 2 G: at 07:29

## 2022-08-15 RX ADMIN — ASPIRIN 325 MG: 325 TABLET, COATED ORAL at 11:37

## 2022-08-15 RX ADMIN — TRANEXAMIC ACID 1 G: 1 INJECTION, SOLUTION INTRAVENOUS at 08:37

## 2022-08-15 RX ADMIN — DULOXETINE HYDROCHLORIDE 60 MG: 60 CAPSULE, DELAYED RELEASE ORAL at 20:19

## 2022-08-15 RX ADMIN — HYDROMORPHONE HYDROCHLORIDE 0.2 MG: 0.2 INJECTION, SOLUTION INTRAMUSCULAR; INTRAVENOUS; SUBCUTANEOUS at 05:28

## 2022-08-15 RX ADMIN — ONDANSETRON HYDROCHLORIDE 4 MG: 2 INJECTION, SOLUTION INTRAVENOUS at 08:03

## 2022-08-15 RX ADMIN — TRANEXAMIC ACID 1 G: 1 INJECTION, SOLUTION INTRAVENOUS at 07:47

## 2022-08-15 RX ADMIN — PROPOFOL 150 MG: 10 INJECTION, EMULSION INTRAVENOUS at 07:41

## 2022-08-15 RX ADMIN — PHENYLEPHRINE HYDROCHLORIDE 100 MCG: 10 INJECTION INTRAVENOUS at 08:00

## 2022-08-15 RX ADMIN — FENTANYL CITRATE 100 MCG: 50 INJECTION, SOLUTION INTRAMUSCULAR; INTRAVENOUS at 07:41

## 2022-08-15 RX ADMIN — PHENYLEPHRINE HYDROCHLORIDE 150 MCG: 10 INJECTION INTRAVENOUS at 08:22

## 2022-08-15 RX ADMIN — PHENYLEPHRINE HYDROCHLORIDE 100 MCG: 10 INJECTION INTRAVENOUS at 07:49

## 2022-08-15 RX ADMIN — Medication 1000 MCG: at 20:19

## 2022-08-15 RX ADMIN — FAMOTIDINE 20 MG: 10 INJECTION, SOLUTION INTRAVENOUS at 16:41

## 2022-08-15 RX ADMIN — PHENYLEPHRINE HYDROCHLORIDE 200 MCG: 10 INJECTION INTRAVENOUS at 07:44

## 2022-08-15 RX ADMIN — HYDROMORPHONE HYDROCHLORIDE 0.5 MG: 1 INJECTION, SOLUTION INTRAMUSCULAR; INTRAVENOUS; SUBCUTANEOUS at 07:55

## 2022-08-15 RX ADMIN — PHENYLEPHRINE HYDROCHLORIDE 100 MCG: 10 INJECTION INTRAVENOUS at 08:13

## 2022-08-15 ASSESSMENT — ACTIVITIES OF DAILY LIVING (ADL)
ADLS_ACUITY_SCORE: 34
ADLS_ACUITY_SCORE: 28
ADLS_ACUITY_SCORE: 28
ADLS_ACUITY_SCORE: 34
ADLS_ACUITY_SCORE: 34
ADLS_ACUITY_SCORE: 28
ADLS_ACUITY_SCORE: 34
ADLS_ACUITY_SCORE: 26
ADLS_ACUITY_SCORE: 34
ADLS_ACUITY_SCORE: 28
ADLS_ACUITY_SCORE: 28
ADLS_ACUITY_SCORE: 34
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
## 2022-08-15 NOTE — ANESTHESIA POSTPROCEDURE EVALUATION
Patient: Jannette Parra    Procedure: Procedure(s):  OPEN REDUCTION INTERNAL FIXATION, FRACTURE, FEMUR, USING INTRAMEDULLARY YAMILKA       Anesthesia Type:  General    Note:     Postop Pain Control: Uneventful            Sign Out: Well controlled pain   PONV: No   Neuro/Psych: Uneventful            Sign Out: Acceptable/Baseline neuro status   Airway/Respiratory: Uneventful            Sign Out: Acceptable/Baseline resp. status   CV/Hemodynamics: Uneventful            Sign Out: Acceptable CV status; No obvious hypovolemia; No obvious fluid overload   Other NRE: NONE   DID A NON-ROUTINE EVENT OCCUR? No           Last vitals:  Vitals Value Taken Time   /62 08/15/22 1020   Temp 97.3  F (36.3  C) 08/15/22 1030   Pulse 99 08/15/22 1038   Resp 27 08/15/22 1038   SpO2 100 % 08/15/22 1040   Vitals shown include unvalidated device data.    Electronically Signed By: Jaime Lemus MD  August 15, 2022  11:21 AM

## 2022-08-15 NOTE — PLAN OF CARE
Goal Outcome Evaluation:    Patient vital signs are at baseline: Yes  Patient able to ambulate as they were prior to admission or with assist devices provided by therapies during their stay:  No,  Reason:  bedrest  Patient MUST void prior to discharge:  Yes, purewick with adequate output  Patient able to tolerate oral intake:  Yes, NPO before surgery  Pain has adequate pain control using Oral analgesics:  No,  Reason:  PRN IV dilaudid, see MAR  Does patient have an identified :  Yes  Has goal D/C date and time been discussed with patient:  No,  Reason:  surgery today at 0710    Pt A/O x4. CMS intact. Fluids infusing. Will continue to monitor.    Pt went down to surgery at 0600.

## 2022-08-15 NOTE — ANESTHESIA PROCEDURE NOTES
Femoral Procedure Note    Pre-Procedure   Staff -        Anesthesiologist:  Jaime Lemus MD       Performed By: anesthesiologist       Location: pre-op       Procedure Start/Stop Times: 8/15/2022 6:54 AM and 8/15/2022 7:09 AM       Pre-Anesthestic Checklist: patient identified, IV checked, site marked, risks and benefits discussed, informed consent, monitors and equipment checked, pre-op evaluation, at physician/surgeon's request and post-op pain management  Timeout:       Correct Patient: Yes        Correct Procedure: Yes        Correct Site: Yes        Correct Position: Yes        Correct Laterality: Yes        Site Marked: Yes  Procedure Documentation  Procedure: Femoral       Diagnosis: L FEMUR FX       Laterality: left       Patient Position: supine       Patient Prep/Sterile Barriers: sterile gloves, mask       Skin prep: Betadine       Needle Type: insulated and short bevel       Needle Gauge: 20.        Ultrasound guided       1. Ultrasound was used to identify targeted nerve, plexus, vascular marker, or fascial plane and place a needle adjacent to it in real-time.       2. Ultrasound was used to visualize the spread of anesthetic in close proximity to the above referenced structure.       Nerve Stim: Initial Level 1 mA.  Lowest motor response mA.    Assessment/Narrative         The placement was negative for: blood aspirated, painful injection and site bleeding       Paresthesias: No.       Bolus given via needle. no blood aspirated via catheter.        Secured via.        Insertion/Infusion Method: Single Shot       Complications: none    Medication(s) Administered   Medication Administration Time: 8/15/2022 6:54 AM     Comments:  30ml of 0.25% Bupivicaine w/ 1:200,000 epi injected    The surgeon has given a verbal order transferring care of this patient to me for the performance of a regional analgesia block for post-op pain control. It is requested of me because I am uniquely trained and qualified to  perform this block and the surgeon is neither trained nor qualified to perform this procedure.

## 2022-08-15 NOTE — PLAN OF CARE
Goal Outcome Evaluation:  Vital signs stable.  CMS intact, mild swelling to left  thigh and knee.  Ice pack applied, medicated with iv dilaudid with relief.  Voiding, purewick in place.  Surgical scrub bath done.  Tolerated diet.  NPO after midnight. Surgery in am.  Plan of Care Reviewed With: patient

## 2022-08-15 NOTE — ANESTHESIA CARE TRANSFER NOTE
Patient: Jannette Parra    Procedure: Procedure(s):  OPEN REDUCTION INTERNAL FIXATION, FRACTURE, FEMUR, USING INTRAMEDULLARY YAMILKA       Diagnosis: Femur fracture, left (H) [S72.92XA]  Diagnosis Additional Information: No value filed.    Anesthesia Type:   General     Note:    Oropharynx: oropharynx clear of all foreign objects  Level of Consciousness: awake  Oxygen Supplementation: face mask    Independent Airway: airway patency satisfactory and stable  Dentition: dentition unchanged  Vital Signs Stable: post-procedure vital signs reviewed and stable  Report to RN Given: handoff report given  Patient transferred to: PACU    Handoff Report: Identifed the Patient, Identified the Reponsible Provider, Reviewed the pertinent medical history, Discussed the surgical course, Reviewed Intra-OP anesthesia mangement and issues during anesthesia, Set expectations for post-procedure period and Allowed opportunity for questions and acknowledgement of understanding      Vitals:  Vitals Value Taken Time   /64 08/15/22 0857   Temp     Pulse 88 08/15/22 0859   Resp 16 08/15/22 0859   SpO2 100 % 08/15/22 0859   Vitals shown include unvalidated device data.    Electronically Signed By: CYNDI Lacy CRNA  August 15, 2022  9:00 AM

## 2022-08-15 NOTE — PLAN OF CARE
Goal Outcome Evaluation:    Plan of Care Reviewed With: patient, spouse             VS-stable, afebrile, on capnography, back to the floor at 1100  Lung Sounds-clear, no cough, on 2 LNC. Using IS upto 1250.   O2-on 2 lNC. Cont pulse ox, on capnography.   GI-+bs, -flatus. Lbm was yesterday. Starting off with ice chips/water   -bladder scanned pt upon arrival back to the floor 933. Up to bsc with 3 assist to pivot to bsc. L knee buckled. Incont of urine on transferring, voided 200 measured, and more on the floor. Bladder scanned afterwards for the highest of 294 and lowest of 24. Voided again and voided 450 with a pvr of 54.   IVF-at 75.    Dressings-3 spots c/d/I on L leg, ice to leg. Tagaderm and dry gauze.   CMS-has numbness on L leg, L knee buckled when up. Able to bend knee in bed. +pp. Strong dorsi/planter flexion. Cool skin on both extremities. f  Drain-none.   Activity-up with 2-3 assist. Gb, walker. Pivot to bsc. X 2. KI used to help prevent knee alicia  Pain-rates pain a 0 at rest, no pain with moving either.   D/C Plan-pt would like to go home at time of discharge. Has PT and OT. Sws following.  at home retired.
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
## 2022-08-15 NOTE — INTERVAL H&P NOTE
I have reviewed the surgical (or preoperative) H&P that is linked to this encounter, and examined the patient. There are no significant changes    Clinical Conditions Present on Arrival:  Clinically Significant Risk Factors Present on Admission            # Hypercalcemia: Ca = 10.5 mg/dL (Ref range: 8.8 - 10.2 mg/dL) and/or iCa = N/A on admission, will monitor as appropriate

## 2022-08-15 NOTE — OP NOTE
[facility]  Operative Note  Cephalomedullary Nailing      Jannette Parra MRN# [id number]   YOB: 1950  Procedure Date:  8/15/2022  Age: 72 year old     PREOPERATIVE DIAGNOSIS:     1.  Displaced left osteoporotic pathologic midshaft femur fracture.   2.  Osteoporosis.      POSTOPERATIVE DIAGNOSES:     1.  Displaced left osteoporotic pathologic midshaft femur fracture.   2.  Osteoporosis.     PROCEDURE PERFORMED:  Left hip cephalomedullary nailing, midshaft femur fracture.      SURGEON:  Armand Schmitz MD     ASSISTANT:  DELISA Patterson who was critical for positioning patient, helping obtain reduction, retraction during exposure and implant placement, as well as closure.     ANESTHESIA:  General.      ESTIMATED BLOOD LOSS:  100 mL      IMPLANTS USED:  Synthes TFN advanced nail 11 mm x 420 mm x 125 degrees, cephalomedullary screw with single distal interlock.      FINDINGS:  Jannette Parra is a 72 year old-year-old female who did do some ambulation prior to this with above-named injury.  A long discussion had regarding the nature of hip fractures, risks related to that and surgery discussed, included but not limited to bleeding, infection, damage to surrounding neurovascular structures, malunion, delayed union, nonunion, need for additional surgeries, blood clots that go to the heart, lung or brain, anesthetic complications or even death.  Discussed hip fracture mortality rates.  The patient and the family wished to proceed and consent signed.      DESCRIPTION OF PROCEDURE:  The patient identified in the preoperative holding area per hospital policy and the correct op site marked, to the OR table, Ancef given.  General anesthesia induced, placed onto the [place] fracture table legs positioned in a scissor position.  All bony prominences well padded.  Slight traction and internal rotation improved the fracture reduction.  Chlorhexidine pre-scrub, ChloraPrep, standard drape.  A timeout was  performed.  All in the room agreed.         A small incision was made just proximal to greater trochanter after infiltrating 1% lidocaine with epinephrine and dissected down the greater trochanter, placed a guide pin confirming position AP and lateral views.  Opening reamer.  Placed a guide pantera using finger tool to aid in fracture reduction and placing the wire across distal center-center position and measured for a 420 mm nail.  Placed the cephalomedullary nail.  Placed the guide wire into the center-center position in the femoral head and reamed, measured and placed the screw.  Checked AP, lateral and 30-degree arcs in between to ensure safe position, as well as appropriate reduction.  Locked the interference screw proximally.  Outrigger removed.  Distal interlock placed.  Final x-rays showed appropriate reduction, safe position of the implants. Thoroughly irrigated wounds, closed deep layers, 0 Vicryl, 2-0 Vicryl in subcutaneous, 4-0 Monocryl subcuticular.  Exofin and sterile dressings applied.      POSTOPERATIVE PLAN:   1.  Weightbearing as tolerated with a walker x6 weeks.   2.  Deep venous thrombosis prophylaxis with aspirin enteric-coated 325 mg daily x6 weeks.   3.  Ancef x 24 hours  4.  Osteoporosis follow up indicated.   5.  Physical therapy.   6.  Pain control.  Minimize narcotics.      POSTOPERATIVE PLAN:  At two weeks, the dressing could be removed.  If all is going well, first followup could be 6 weeks with Dr. Santana, [facility], with 2 views of the right femur, sooner if there is any difficulty.      [facility]  [facility]    Monday 1-5 PM  and Thursday 7:30-12:00 AM  [address] [place] [address]  [phone]    [phone]    Or    Tuesday 7:30-5 PM  [address]   [address]  [place], [place]     YVETTE SANTANA MD

## 2022-08-16 ENCOUNTER — APPOINTMENT (OUTPATIENT)
Dept: PHYSICAL THERAPY | Facility: CLINIC | Age: 72
DRG: 481 | End: 2022-08-16
Attending: PHYSICIAN ASSISTANT
Payer: MEDICARE

## 2022-08-16 LAB
GLUCOSE SERPL-MCNC: 122 MG/DL (ref 70–99)
HGB BLD-MCNC: 8.2 G/DL (ref 11.7–15.7)

## 2022-08-16 PROCEDURE — 97530 THERAPEUTIC ACTIVITIES: CPT | Mod: GP | Performed by: PHYSICAL THERAPIST

## 2022-08-16 PROCEDURE — 250N000013 HC RX MED GY IP 250 OP 250 PS 637: Performed by: PHYSICIAN ASSISTANT

## 2022-08-16 PROCEDURE — 258N000003 HC RX IP 258 OP 636: Performed by: PHYSICIAN ASSISTANT

## 2022-08-16 PROCEDURE — 120N000001 HC R&B MED SURG/OB

## 2022-08-16 PROCEDURE — 97116 GAIT TRAINING THERAPY: CPT | Mod: GP | Performed by: PHYSICAL THERAPIST

## 2022-08-16 PROCEDURE — 82947 ASSAY GLUCOSE BLOOD QUANT: CPT | Performed by: HOSPITALIST

## 2022-08-16 PROCEDURE — 99232 SBSQ HOSP IP/OBS MODERATE 35: CPT | Performed by: HOSPITALIST

## 2022-08-16 PROCEDURE — 36415 COLL VENOUS BLD VENIPUNCTURE: CPT | Performed by: PHYSICIAN ASSISTANT

## 2022-08-16 PROCEDURE — 250N000011 HC RX IP 250 OP 636: Performed by: PHYSICIAN ASSISTANT

## 2022-08-16 PROCEDURE — 250N000013 HC RX MED GY IP 250 OP 250 PS 637: Performed by: INTERNAL MEDICINE

## 2022-08-16 PROCEDURE — 97161 PT EVAL LOW COMPLEX 20 MIN: CPT | Mod: GP | Performed by: PHYSICAL THERAPIST

## 2022-08-16 PROCEDURE — 250N000013 HC RX MED GY IP 250 OP 250 PS 637: Performed by: HOSPITALIST

## 2022-08-16 PROCEDURE — 85018 HEMOGLOBIN: CPT | Performed by: PHYSICIAN ASSISTANT

## 2022-08-16 RX ORDER — DOCUSATE SODIUM 100 MG/1
100 CAPSULE, LIQUID FILLED ORAL 2 TIMES DAILY
Status: DISCONTINUED | OUTPATIENT
Start: 2022-08-16 | End: 2022-08-18 | Stop reason: HOSPADM

## 2022-08-16 RX ORDER — HYDROCODONE BITARTRATE AND ACETAMINOPHEN 7.5; 325 MG/1; MG/1
1 TABLET ORAL EVERY 6 HOURS PRN
Qty: 25 TABLET | Refills: 0 | Status: SHIPPED | OUTPATIENT
Start: 2022-08-16

## 2022-08-16 RX ORDER — POLYETHYLENE GLYCOL 3350 17 G/17G
17 POWDER, FOR SOLUTION ORAL 2 TIMES DAILY
Status: DISCONTINUED | OUTPATIENT
Start: 2022-08-16 | End: 2022-08-18 | Stop reason: HOSPADM

## 2022-08-16 RX ORDER — DOCUSATE SODIUM 100 MG/1
100 CAPSULE, LIQUID FILLED ORAL 2 TIMES DAILY PRN
Qty: 20 CAPSULE | DISCHARGE
Start: 2022-08-16

## 2022-08-16 RX ORDER — ASPIRIN 325 MG
325 TABLET, DELAYED RELEASE (ENTERIC COATED) ORAL DAILY
Qty: 30 TABLET | Refills: 0 | DISCHARGE
Start: 2022-08-17

## 2022-08-16 RX ADMIN — FAMOTIDINE 20 MG: 10 INJECTION, SOLUTION INTRAVENOUS at 06:10

## 2022-08-16 RX ADMIN — HYDROCODONE BITARTRATE AND ACETAMINOPHEN 1 TABLET: 7.5; 325 TABLET ORAL at 21:06

## 2022-08-16 RX ADMIN — FAMOTIDINE 20 MG: 10 INJECTION, SOLUTION INTRAVENOUS at 17:14

## 2022-08-16 RX ADMIN — GABAPENTIN 600 MG: 300 CAPSULE ORAL at 21:02

## 2022-08-16 RX ADMIN — DOCUSATE SODIUM 100 MG: 100 CAPSULE, LIQUID FILLED ORAL at 21:02

## 2022-08-16 RX ADMIN — ASPIRIN 325 MG: 325 TABLET, COATED ORAL at 08:25

## 2022-08-16 RX ADMIN — GABAPENTIN 600 MG: 300 CAPSULE ORAL at 08:25

## 2022-08-16 RX ADMIN — CEFAZOLIN 1 G: 1 INJECTION, POWDER, FOR SOLUTION INTRAMUSCULAR; INTRAVENOUS at 00:36

## 2022-08-16 RX ADMIN — DEXTROSE AND SODIUM CHLORIDE: 5; 900 INJECTION, SOLUTION INTRAVENOUS at 01:50

## 2022-08-16 RX ADMIN — HYDROMORPHONE HYDROCHLORIDE 0.2 MG: 0.2 INJECTION, SOLUTION INTRAMUSCULAR; INTRAVENOUS; SUBCUTANEOUS at 06:20

## 2022-08-16 RX ADMIN — POLYETHYLENE GLYCOL 3350 17 G: 17 POWDER, FOR SOLUTION ORAL at 11:39

## 2022-08-16 RX ADMIN — Medication 1000 MCG: at 21:02

## 2022-08-16 RX ADMIN — DOCUSATE SODIUM 100 MG: 100 CAPSULE, LIQUID FILLED ORAL at 11:39

## 2022-08-16 RX ADMIN — DULOXETINE HYDROCHLORIDE 60 MG: 60 CAPSULE, DELAYED RELEASE ORAL at 21:02

## 2022-08-16 ASSESSMENT — ACTIVITIES OF DAILY LIVING (ADL)
ADLS_ACUITY_SCORE: 30
ADLS_ACUITY_SCORE: 26
DEPENDENT_IADLS:: INDEPENDENT
ADLS_ACUITY_SCORE: 34
ADLS_ACUITY_SCORE: 30
ADLS_ACUITY_SCORE: 26
ADLS_ACUITY_SCORE: 30
ADLS_ACUITY_SCORE: 26
ADLS_ACUITY_SCORE: 34
ADLS_ACUITY_SCORE: 26
ADLS_ACUITY_SCORE: 34

## 2022-08-16 NOTE — PROGRESS NOTES
08/16/22 0958   Quick Adds   Type of Visit Initial PT Evaluation   Living Environment   Living Environment Comments Pt lives in home with spouse; 2 CHRISTOPHER with L railing. All needs on main level. Mod ind with SEC, was not using during gardening. Walkin shower.   Self-Care   Usual Activity Tolerance moderate   Current Activity Tolerance fair   Equipment Currently Used at Home cane, straight;walker, rolling;crutches;raised toilet seat;shower chair   Fall history within last six months yes   Number of times patient has fallen within last six months 2   General Information   Onset of Illness/Injury or Date of Surgery 08/14/22   Referring Physician Jason Barrera PA-C   Patient/Family Therapy Goals Statement (PT) To decrease pain, improve mobility   Pertinent History of Current Problem (include personal factors and/or comorbidities that impact the POC) Pt is a 72 year old female who was admitted on 8/14/2022 with a displaced pathologic osteoporotic left midshaft femur fracture.   Existing Precautions/Restrictions fall;weight bearing   Weight-Bearing Status - LLE weight-bearing as tolerated   Cognition   Affect/Mental Status (Cognition) WFL   Orientation Status (Cognition) oriented x 4   Follows Commands (Cognition) WFL   Pain Assessment   Patient Currently in Pain Yes, see Vital Sign flowsheet   Range of Motion (ROM)   ROM Comment decreased L knee/hip ROM 2/2 surgical pain   Strength (Manual Muscle Testing)   Strength Comments able to complete B SLR; functionally demonstrated 3/5 B LE strength   Bed Mobility   Comment, (Bed Mobility) Teofilo supine <> sit   Transfers   Comment, (Transfers) CGA sit <> stand   Gait/Stairs (Locomotion)   Distance in Feet (Required for LE Total Joints) 4   Pattern (Gait) step-to   Deviations/Abnormal Patterns (Gait) ataxic;base of support, wide;gait speed decreased;weight shifting decreased   Comment, (Gait/Stairs) CGA with FWW, heavy UE support   Balance   Balance Comments good sitting;  fair- standing with FWW   Clinical Impression   Criteria for Skilled Therapeutic Intervention Yes, treatment indicated   PT Diagnosis (PT) impaired functional mobility   Influenced by the following impairments decreased L hip ROM/strength; impaired balance   Functional limitations due to impairments impaired bed mobility, transfers, ambulation, stairs   Clinical Presentation (PT Evaluation Complexity) Stable/Uncomplicated   Clinical Presentation Rationale Pt is medically stable   Clinical Decision Making (Complexity) low complexity   Planned Therapy Interventions (PT) balance training;bed mobility training;gait training;home exercise program;patient/family education;neuromuscular re-education;ROM (range of motion);stair training;strengthening;transfer training   Anticipated Equipment Needs at Discharge (PT) walker, rolling   Risk & Benefits of therapy have been explained evaluation/treatment results reviewed;care plan/treatment goals reviewed;risks/benefits reviewed;current/potential barriers reviewed;participants voiced agreement with care plan;participants included;patient   PT Discharge Planning   PT Discharge Recommendation (DC Rec) Transitional Care Facility;home with home care physical therapy;home with assist   PT Rationale for DC Rec Pt currently only able to ambulate ~15 feet with FWW, would benefit from continued skilled PT in TCU setting to progress mobility to return home. Pt needs to navigate 2 stairs in order to return home.   PT Brief overview of current status Ax1 with FWW   Plan of Care Review   Plan of Care Reviewed With patient   Total Evaluation Time   Total Evaluation Time (Minutes) 8   Physical Therapy Goals   PT Frequency 5x/week   PT Predicted Duration/Target Date for Goal Attainment 08/20/22   PT Goals Bed Mobility;Transfers;Gait;Stairs   PT: Bed Mobility Supervision/stand-by assist;Supine to/from sit;Within precautions   PT: Transfers Supervision/stand-by assist;Sit to/from stand;Within  precautions;Assistive device   PT: Gait Supervision/stand-by assist;100 feet;Assistive device   PT: Stairs 2 stairs;Rail on left;Minimal assist

## 2022-08-16 NOTE — PLAN OF CARE
Patient vital signs are at baseline: Yes  Patient able to ambulate as they were prior to admission or with assist devices provided by therapies during their stay:  Yes with GB and walker, assist x2  Patient MUST void prior to discharge:  Yes to bedside commode  Patient able to tolerate oral intake:  Yes on regular diet.   Pain has adequate pain control using Oral analgesics:  Yes, mild pain, increasing closer to AM hours. Provided PRN Dilaudid.   Does patient have an identified :  Yes  and daughter from [place], [place]  Has goal D/C date and time been discussed with patient:  Yes discharge plan pending.     Pt is A&O x4. On RA. IV infusing. Dressing x3 CDI. CMS intact. Weight bearing as tolerated. Pivoted to the bedside commode feeling lightheaded. On Cap[place].

## 2022-08-16 NOTE — PLAN OF CARE
Patient vital signs are at baseline: Yes  Patient able to ambulate as they were prior to admission or with assist devices provided by therapies during their stay:  Yes, with 2 assist, walker, gait belt  Patient MUST void prior to discharge:  Yes  Patient able to tolerate oral intake:  Yes  Pain has adequate pain control using Oral analgesics:  Yes  Does patient have an identified :  No,  Reason:  TCU placement   Has goal D/C date and time been discussed with patient:  No,  Reason:  TBD, INCISIONS on left leg is CDI, covered with dressing.    /54 (BP Location: Right arm, Cuff Size: Adult Regular)   Pulse 92   Temp 98.5  F (36.9  C) (Temporal)   Resp 16   SpO2 97%

## 2022-08-16 NOTE — CONSULTS
Care Management Initial Consult    General Information  Assessment completed with: Patient, Patient  Type of CM/SW Visit: Initial Assessment    Primary Care Provider verified and updated as needed: Yes   Readmission within the last 30 days: no previous admission in last 30 days      Reason for Consult: discharge planning  Advance Care Planning:            Communication Assessment  Patient's communication style: spoken language (English or Bilingual)    Hearing Difficulty or Deaf: no   Wear Glasses or Blind: no    Cognitive  Cognitive/Neuro/Behavioral: WDL  Level of Consciousness: alert  Arousal Level: opens eyes spontaneously  Orientation: oriented x 4  Mood/Behavior: calm, cooperative  Best Language: 0 - No aphasia  Speech: clear, spontaneous, logical    Living Environment:   People in home: spouse     Current living Arrangements: house      Able to return to prior arrangements: no  Living Arrangement Comments: Needs TCU prior to returning to home for ambultion    Family/Social Support:  Care provided by: self  Provides care for: no one  Marital Status:             Description of Support System: Supportive, Involved    Support Assessment: Adequate family and caregiver support    Current Resources:   Patient receiving home care services: No     Community Resources: None  Equipment currently used at home: cane, straight  Supplies currently used at home: None    Employment/Financial:  Employment Status:          Financial Concerns:             Lifestyle & Psychosocial Needs:  Social Determinants of Health     Tobacco Use: Low Risk      Smoking Tobacco Use: Never Smoker     Smokeless Tobacco Use: Never Used   Alcohol Use: Not on file   Financial Resource Strain: Not on file   Food Insecurity: Not on file   Transportation Needs: Not on file   Physical Activity: Not on file   Stress: Not on file   Social Connections: Not on file   Intimate Partner Violence: Not on file   Depression: Not on file   Housing  Stability: Not on file       Functional Status:  Prior to admission patient needed assistance:   Dependent ADLs:: Ambulation-cane  Dependent IADLs:: Independent  Assesssment of Functional Status: Not at baseline with ADL Functioning    Mental Health Status:          Chemical Dependency Status:                Values/Beliefs:  Spiritual, Cultural Beliefs, Gnosticist Practices, Values that affect care:                 Additional Information:    Met with pt at bedside to discuss discharge planning. Pt reports that she lives at home with her spouse and is independent with cares at baseline. Pt reports using a cane at baseline but has used a 4WW since in the hospital. Pt anticipates  will provide transport upon discharge. Pt is open to this writer sending TCU referrals as close to home as possible. Pt would like private room and is aware of potential fees associated. SW following.     Addendum    [facility] can accept pt tomorrow 8/17 anytime before 1400 to a private room. Met with pt/ at bedside to inform. Pt will discharge tomorrow 8/17 to [facility] with  providing transport.     Addendum    Pt  will provide transport to [facility] at 1200 8/17.     ДМИТРИЙ Ham, [facility]  Inpatient Care Coordination  Ortho/Spine Unit  [phone]  Juhi Vega, [facility]

## 2022-08-16 NOTE — PROGRESS NOTES
[facility]    Medicine Progress Note - Hospitalist Service    Date of Admission:  8/14/2022    Assessment & Plan        Jannette Parra is a 72 year old lady with macular degeneration, rheumatoid arthritis, osteoporosis, history of brain abscess who came to [facility] 8/14/2022 after she tripped and fell backwards in her garden and was found to have L leg fracture        Left femur fracture  Mechanical fall  -X-ray showed oblique fracture at the medial midshaft of the left femur  -S/p left cephalomedullary nailing of the midshaft of femur by Dr. Armand Schmitz MD 8/15/2022  -stop fluids today,  per ortho  -evaluated by PT and did rather poorly, appears will need TCU    Constipation  -2/2 pain meds, immobilization, etc  -start on scheduled miralax and colace     Acute blood loss anemia w/dilutional component  -multifactorial from surgery, fluids, etc  -down to 8.2 from 9.7 yesterday, will stop fluids and monitor. No active bleeding so ok to cont   -rpt labs in am, transfuse <7    HX Rheumatoid arthritis, Osteoporosis, Macular degeneration, History of brain abscess  Hypercalcemia   -cont home meds     Diet: Advance Diet as Tolerated: Regular Diet Adult    DVT Prophylaxis:  per ortho  Brooke Catheter: Not present  Central Lines: None  Cardiac Monitoring: None  Code Status: Full Code      Disposition Plan   Anticipate will need TCU     The patient's care was discussed with the Patient.    Aurelio Suarez,   Hospitalist Service  [facility]  Securely message with the Vocera Web Console (learn more here)  Text page via [employer] Paging/Directory   ______________________________________________________________________    Interval History   Reports some nausea after attempting to ambulate, still with significant pain with ambulation but controlled at rest. Hgb downtrending some, no active bleeding but post op. Passing some gas, no BM since  admission and some abd bloating present    Data reviewed today: I reviewed all medications, new labs and imaging results over the last 24 hours.     Physical Exam   Vital Signs: Temp: 98.5  F (36.9  C) Temp src: Temporal BP: 101/54 Pulse: 92   Resp: 16 SpO2: 97 % O2 Device: None (Room air) Oxygen Delivery: 2 LPM  Weight: 0 lbs 0 oz  Constitutional: awake, alert and cooperative  Eyes: pupils equal, round and reactive to light and conjunctiva normal  ENT: normocepalic, without obvious abnormality, atramatic  Respiratory: no increased work of breathing, good air exchange and clear to auscultation  Cardiovascular: regular rate and rhythm and no murmur noted  GI: hypoactive bowel sounds, soft, distended and non-tender  Skin: L leg with some increased edema and discoloration, no large hematomas present  Neurologic: alert, oriented, L leg with decreased movement/strength due to pain    Data   Recent Labs   Lab 08/16/22  0603 08/15/22  0636 08/14/22  1218   WBC  --  6.4 9.4   HGB 8.2* 9.7* 11.4*   MCV  --  101* 99   PLT  --  218 254   INR  --  1.00  --    NA  --  138 140   POTASSIUM  --  4.2 4.1   CHLORIDE  --  106 105   CO2  --  27 27   BUN  --  9.3 14.4   CR  --  0.52 0.62   ANIONGAP  --  5* 8   JOSEPH  --  9.2 10.5*   * 103* 104*     No results found for this or any previous visit (from the past 24 hour(s)).  Medications       aspirin  325 mg Oral Daily     docusate sodium  100 mg Oral BID     DULoxetine  60 mg Oral QPM     famotidine  20 mg Intravenous Q12H     folic acid  1,000 mcg Oral Daily     gabapentin  600 mg Oral BID     polyethylene glycol  17 g Oral BID     sodium chloride (PF)  3 mL Intracatheter Q8H     sodium chloride (PF)  3 mL Intracatheter Q8H

## 2022-08-16 NOTE — PROGRESS NOTES
08/16/22 0958   Quick Adds   Type of Visit Initial PT Evaluation   Living Environment   Living Environment Comments Pt lives in home with spouse; 2 CHRISTOPHER with L railing. All needs on main level. Mod ind with SEC, was not using during gardening. Walkin shower.   Self-Care   Usual Activity Tolerance moderate   Current Activity Tolerance fair   Equipment Currently Used at Home cane, straight;walker, rolling;crutches;raised toilet seat;shower chair   Fall history within last six months yes   Number of times patient has fallen within last six months 2   General Information   Onset of Illness/Injury or Date of Surgery 08/14/22   Referring Physician Jason Barrera PA-C   Patient/Family Therapy Goals Statement (PT) To decrease pain, improve mobility   Pertinent History of Current Problem (include personal factors and/or comorbidities that impact the POC) Pt is a 72 year old female who was admitted on 8/14/2022 with a displaced pathologic osteoporotic left midshaft femur fracture.   Existing Precautions/Restrictions fall;weight bearing   Weight-Bearing Status - LLE weight-bearing as tolerated   Cognition   Affect/Mental Status (Cognition) WFL   Orientation Status (Cognition) oriented x 4   Follows Commands (Cognition) WFL   Pain Assessment   Patient Currently in Pain Yes, see Vital Sign flowsheet   Range of Motion (ROM)   ROM Comment decreased L knee/hip ROM 2/2 surgical pain   Strength (Manual Muscle Testing)   Strength Comments able to complete B SLR; functionally demonstrated 3/5 B LE strength   Bed Mobility   Comment, (Bed Mobility) Teofilo supine <> sit   Transfers   Comment, (Transfers) CGA sit <> stand   Gait/Stairs (Locomotion)   Distance in Feet (Required for LE Total Joints) 4   Pattern (Gait) step-to   Deviations/Abnormal Patterns (Gait) ataxic;base of support, wide;gait speed decreased;weight shifting decreased   Comment, (Gait/Stairs) CGA with FWW, heavy UE support   Balance   Balance Comments good sitting;  fair- standing with FWW   Clinical Impression   Criteria for Skilled Therapeutic Intervention Yes, treatment indicated   PT Diagnosis (PT) impaired functional mobility   Influenced by the following impairments decreased L hip ROM/strength; impaired balance   Functional limitations due to impairments impaired bed mobility, transfers, ambulation, stairs   Clinical Presentation (PT Evaluation Complexity) Stable/Uncomplicated   Clinical Presentation Rationale Pt is medically stable   Clinical Decision Making (Complexity) low complexity   Planned Therapy Interventions (PT) balance training;bed mobility training;gait training;home exercise program;patient/family education;neuromuscular re-education;ROM (range of motion);stair training;strengthening;transfer training   Anticipated Equipment Needs at Discharge (PT) walker, rolling   Risk & Benefits of therapy have been explained evaluation/treatment results reviewed;care plan/treatment goals reviewed;risks/benefits reviewed;current/potential barriers reviewed;participants voiced agreement with care plan;participants included;patient   PT Discharge Planning   PT Discharge Recommendation (DC Rec) Transitional Care Facility;home with home care physical therapy;home with assist   PT Rationale for DC Rec Pt currently only able to ambulate ~15 feet with FWW, would benefit from continued skilled PT in TCU setting to progress mobility to return home. Pt needs to navigate 2 stairs in order to return home.   PT Brief overview of current status Ax1 with FWW   Plan of Care Review   Plan of Care Reviewed With patient   Total Evaluation Time   Total Evaluation Time (Minutes) 8

## 2022-08-16 NOTE — PLAN OF CARE
Goal Outcome Evaluation:    Plan of Care Reviewed With: patient, spouse     Overall Patient Progress: improving       Pt on RA, 2A pivot WGB, left PIV, continent.  Pt transferred well with 2 assist to bedside commode, no buckling. Pt states the feeling is coming back to her leg but has not reported any discomfort or pain. Left knee is swollen and warm to the touch, nwb. Continue to monitor.

## 2022-08-16 NOTE — PROGRESS NOTES
Orthopedic Surgery  Jannette Parra  08/16/2022     Admit Date:  8/14/2022  POD: 1 Day Post-Op   Procedure(s):  Left hip cephalomedullary nailing, midshaft femur fracture    Alert and oriented.   Patient resting comfortably in bed.    Pain controlled.  Tolerating oral intake.    Denies nausea or vomiting  Denies chest pain or shortness of breath    Temp:  [97.3  F (36.3  C)-98.7  F (37.1  C)] 98.5  F (36.9  C)  Pulse:  [] 92  Resp:  [13-27] 16  BP: ()/(43-62) 101/54  SpO2:  [93 %-100 %] 97 %    Dressing is clean, dry, and intact.   Minimal erythema of the surrounding skin. Mild thigh swelling.   Bilateral calves are soft, non-tender.  Left lower extremity is NVI.  Sensation intact bilateral lower extremities  Patient able to resist dorsi and plantar flexion bilaterally  +Dp pulse    Labs:  Recent Labs   Lab Test 08/16/22  0603 08/15/22  0636 08/14/22  1218   WBC  --  6.4 9.4   HGB 8.2* 9.7* 11.4*   PLT  --  218 254     Recent Labs   Lab Test 08/15/22  0636   INR 1.00     1. PLAN:   Continue ASA 325mg daily for DVT prophylaxis.     Mobilize with PT/OT    WBAT Left LE with walker    Continue current pain regiment.   Dressings: Keep intact.  Change if >60% saturated or peeling off.    Follow-up: 2 weeks     2. Disposition   Anticipate d/c to TCU vs home pending PT progression.     Rachelle Boothe PA-C

## 2022-08-17 ENCOUNTER — APPOINTMENT (OUTPATIENT)
Dept: CT IMAGING | Facility: CLINIC | Age: 72
DRG: 481 | End: 2022-08-17
Attending: HOSPITALIST
Payer: MEDICARE

## 2022-08-17 ENCOUNTER — APPOINTMENT (OUTPATIENT)
Dept: PHYSICAL THERAPY | Facility: CLINIC | Age: 72
DRG: 481 | End: 2022-08-17
Payer: MEDICARE

## 2022-08-17 LAB
ANION GAP SERPL CALCULATED.3IONS-SCNC: 7 MMOL/L (ref 7–15)
BUN SERPL-MCNC: 8.5 MG/DL (ref 8–23)
CALCIUM SERPL-MCNC: 9 MG/DL (ref 8.8–10.2)
CHLORIDE SERPL-SCNC: 103 MMOL/L (ref 98–107)
CREAT SERPL-MCNC: 0.53 MG/DL (ref 0.51–0.95)
DEPRECATED HCO3 PLAS-SCNC: 27 MMOL/L (ref 22–29)
ERYTHROCYTE [DISTWIDTH] IN BLOOD BY AUTOMATED COUNT: 14.1 % (ref 10–15)
GFR SERPL CREATININE-BSD FRML MDRD: >90 ML/MIN/1.73M2
GLUCOSE SERPL-MCNC: 181 MG/DL (ref 70–99)
HCT VFR BLD AUTO: 25.8 % (ref 35–47)
HGB BLD-MCNC: 7.7 G/DL (ref 11.7–15.7)
MCH RBC QN AUTO: 30.1 PG (ref 26.5–33)
MCHC RBC AUTO-ENTMCNC: 29.8 G/DL (ref 31.5–36.5)
MCV RBC AUTO: 101 FL (ref 78–100)
PLATELET # BLD AUTO: 202 10E3/UL (ref 150–450)
POTASSIUM SERPL-SCNC: 3.7 MMOL/L (ref 3.4–5.3)
RBC # BLD AUTO: 2.56 10E6/UL (ref 3.8–5.2)
SODIUM SERPL-SCNC: 137 MMOL/L (ref 136–145)
WBC # BLD AUTO: 8.4 10E3/UL (ref 4–11)

## 2022-08-17 PROCEDURE — G1010 CDSM STANSON: HCPCS

## 2022-08-17 PROCEDURE — 250N000011 HC RX IP 250 OP 636: Performed by: PHYSICIAN ASSISTANT

## 2022-08-17 PROCEDURE — L1832 KO ADJ JNT POS R SUP PRE CST: HCPCS

## 2022-08-17 PROCEDURE — 99232 SBSQ HOSP IP/OBS MODERATE 35: CPT | Performed by: HOSPITALIST

## 2022-08-17 PROCEDURE — 250N000013 HC RX MED GY IP 250 OP 250 PS 637: Performed by: PHYSICIAN ASSISTANT

## 2022-08-17 PROCEDURE — 82310 ASSAY OF CALCIUM: CPT | Performed by: HOSPITALIST

## 2022-08-17 PROCEDURE — 97530 THERAPEUTIC ACTIVITIES: CPT | Mod: GP | Performed by: PHYSICAL THERAPIST

## 2022-08-17 PROCEDURE — 36415 COLL VENOUS BLD VENIPUNCTURE: CPT | Performed by: HOSPITALIST

## 2022-08-17 PROCEDURE — 85027 COMPLETE CBC AUTOMATED: CPT | Performed by: HOSPITALIST

## 2022-08-17 PROCEDURE — 250N000013 HC RX MED GY IP 250 OP 250 PS 637: Performed by: HOSPITALIST

## 2022-08-17 PROCEDURE — 120N000001 HC R&B MED SURG/OB

## 2022-08-17 PROCEDURE — 97116 GAIT TRAINING THERAPY: CPT | Mod: GP | Performed by: PHYSICAL THERAPIST

## 2022-08-17 PROCEDURE — 250N000013 HC RX MED GY IP 250 OP 250 PS 637: Performed by: INTERNAL MEDICINE

## 2022-08-17 RX ADMIN — HYDROCODONE BITARTRATE AND ACETAMINOPHEN 1 TABLET: 7.5; 325 TABLET ORAL at 22:53

## 2022-08-17 RX ADMIN — POLYETHYLENE GLYCOL 3350 17 G: 17 POWDER, FOR SOLUTION ORAL at 20:25

## 2022-08-17 RX ADMIN — DULOXETINE HYDROCHLORIDE 60 MG: 60 CAPSULE, DELAYED RELEASE ORAL at 20:26

## 2022-08-17 RX ADMIN — POLYETHYLENE GLYCOL 3350 17 G: 17 POWDER, FOR SOLUTION ORAL at 08:09

## 2022-08-17 RX ADMIN — FAMOTIDINE 20 MG: 10 INJECTION, SOLUTION INTRAVENOUS at 17:27

## 2022-08-17 RX ADMIN — GABAPENTIN 600 MG: 300 CAPSULE ORAL at 20:26

## 2022-08-17 RX ADMIN — ASPIRIN 325 MG: 325 TABLET, COATED ORAL at 08:10

## 2022-08-17 RX ADMIN — Medication 1000 MCG: at 20:26

## 2022-08-17 RX ADMIN — FAMOTIDINE 20 MG: 10 INJECTION, SOLUTION INTRAVENOUS at 05:32

## 2022-08-17 RX ADMIN — DOCUSATE SODIUM 100 MG: 100 CAPSULE, LIQUID FILLED ORAL at 08:10

## 2022-08-17 RX ADMIN — HYDROCODONE BITARTRATE AND ACETAMINOPHEN 1 TABLET: 7.5; 325 TABLET ORAL at 06:50

## 2022-08-17 RX ADMIN — GABAPENTIN 600 MG: 300 CAPSULE ORAL at 08:10

## 2022-08-17 RX ADMIN — HYDROCODONE BITARTRATE AND ACETAMINOPHEN 1 TABLET: 7.5; 325 TABLET ORAL at 16:11

## 2022-08-17 RX ADMIN — DOCUSATE SODIUM 100 MG: 100 CAPSULE, LIQUID FILLED ORAL at 20:26

## 2022-08-17 ASSESSMENT — ACTIVITIES OF DAILY LIVING (ADL)
ADLS_ACUITY_SCORE: 26

## 2022-08-17 NOTE — PLAN OF CARE
Patient vital signs are at baseline: Yes  Patient able to ambulate as they were prior to admission or with assist devices provided by therapies during their stay:  Yes, use walker, gait belt, 2 assist, pt. Has immobilizer on her left leg.  Patient MUST void prior to discharge:  Yes  Patient able to tolerate oral intake:  Yes  Pain has adequate pain control using Oral analgesics:  Yes  Does patient have an identified :  Yes, will go to TCU,  Has goal D/C date and time been discussed with patient:  No,  Reason:  TBD     /51 (BP Location: Right arm)   Pulse 89   Temp 97.8  F (36.6  C) (Temporal)   Resp 18   SpO2 94%

## 2022-08-17 NOTE — PROGRESS NOTES
[facility]    Medicine Progress Note - Hospitalist Service    Date of Admission:  8/14/2022    Assessment & Plan        Jannette Parra is a 72 year old lady with macular degeneration, rheumatoid arthritis, osteoporosis, history of brain abscess who came to [facility] 8/14/2022 after she tripped and fell backwards in her garden and was found to have L leg fracture    Planned to discharge today but reported severe L knee pain. Ortho to reevaluate and will monitor overnight        Left femur fracture  Mechanical fall  -X-ray showed oblique fracture at the medial midshaft of the left femur  -S/p left cephalomedullary nailing of the midshaft of femur by Dr. Armand Schmitz MD 8/15/2022  -cont  per ortho  -struggling with PT due to L knee/hip pain, plan to discharge to TCU tomorrow if pain better controlled    L knee pain  -acute on chronic issue, had joint injection 2 weeks ago as outpt  -CT done and shows joint effusion, no significant hematoma present  -seen by ortho, recommending HKB for knee stabilization    Constipation  -2/2 pain meds, immobilization, etc  -start on scheduled miralax and colace     Acute blood loss anemia w/dilutional component  -multifactorial from surgery, fluids, etc  -No active bleeding so ok to cont   -fluids stopped and downtrend has slowed  -rpt labs in am, transfuse <7    HX Rheumatoid arthritis, Osteoporosis, Macular degeneration, History of brain abscess  Hypercalcemia   -cont home meds     Diet: Advance Diet as Tolerated: Regular Diet Adult    DVT Prophylaxis:  per ortho  Brooke Catheter: Not present  Central Lines: None  Cardiac Monitoring: None  Code Status: Full Code      Disposition Plan    Expected Discharge Date: 08/18/2022, 12:00 PM    Destination: nursing home  Discharge Comments: pain controlled        The patient's care was discussed with the Patient.    Aurelio Suarez,   Hospitalist Service  [facility]  [facility]  Securely message with the Vocera Web Console (learn more here)  Text page via [facility] Paging/Directory   __________________________________________________________________    Interval History   Patient reporting poor pain control overnight but in her L knee, states hip pain is fine. Had join injection 2 weeks ago and apparently L knee chronically problem due to arthritis. Hgb downtrending but less acutely.    Data reviewed today: I reviewed all medications, new labs and imaging results over the last 24 hours.    Physical Exam   Vital Signs: Temp: 97.8  F (36.6  C) Temp src: Temporal BP: 102/51 Pulse: 89   Resp: 18 SpO2: 94 % O2 Device: None (Room air)    Weight: 0 lbs 0 oz  Constitutional: awake, alert and cooperative  Eyes: pupils equal, round and reactive to light and conjunctiva normal  ENT: normocepalic, without obvious abnormality, atramatic  Respiratory: no increased work of breathing, good air exchange and clear to auscultation  Cardiovascular: regular rate and rhythm and no murmur noted  GI: hypoactive bowel sounds, soft, distended and non-tender  Skin: L leg with some increased edema and discoloration, no large hematomas present  Neurologic: alert, oriented, L leg with decreased movement/strength due to pain    Data   Recent Labs   Lab 08/17/22  0923 08/16/22  0603 08/15/22  0636 08/14/22  1218   WBC 8.4  --  6.4 9.4   HGB 7.7* 8.2* 9.7* 11.4*   *  --  101* 99     --  218 254   INR  --   --  1.00  --      --  138 140   POTASSIUM 3.7  --  4.2 4.1   CHLORIDE 103  --  106 105   CO2 27  --  27 27   BUN 8.5  --  9.3 14.4   CR 0.53  --  0.52 0.62   ANIONGAP 7  --  5* 8   JOSEPH 9.0  --  9.2 10.5*   * 122* 103* 104*     Recent Results (from the past 24 hour(s))   CT Knee Left w/o Contrast    Narrative    CT KNEE LEFT WITHOUT CONTRAST   8/17/2022 11:20 AM     HISTORY: Severe knee pain, history of osteoarthritis but now status  post left femur surgery. Question arthritis vs.  Hematoma or other.    COMPARISON: None.    TECHNIQUE: Axial thin section CT images of the knee with 2-D  reformats. Dose reduction techniques utilized.    FINDINGS:    Bones and joints: Partially imaged hardware in the distal femur.  Moderate knee joint effusion. No popliteal cyst. Mild tricompartmental  osteoarthrosis. Chondrocalcinosis in the medial and lateral menisci  and in the lateral tibial plateau cartilage. There are calcified  intra-articular bodies in the popliteus sheath.    Muscles and tendons: No muscle atrophy. The extensor mechanism is  intact.    Soft tissues: No hematoma or mass. Mild edema in the soft tissues  diffusely. No popliteal cyst.      Impression    IMPRESSION:  1. Mild tricompartmental osteoarthrosis of the knee. Moderate knee  joint effusion. Chondrocalcinosis. Intra-articular bodies.  2. Mild soft tissue edema about the knee. No hematoma, cyst or mass.    JOEY FARAH MD         SYSTEM ID:  [id number]     Medications       aspirin  325 mg Oral Daily     docusate sodium  100 mg Oral BID     DULoxetine  60 mg Oral QPM     famotidine  20 mg Intravenous Q12H     folic acid  1,000 mcg Oral Daily     gabapentin  600 mg Oral BID     polyethylene glycol  17 g Oral BID     sodium chloride (PF)  3 mL Intracatheter Q8H     sodium chloride (PF)  3 mL Intracatheter Q8H

## 2022-08-17 NOTE — PROGRESS NOTES
Fit patient with Saskia Slater.  I provided physical, written and verbal instruction as well as contact information.  I went over adjustments and manipulation of the locks and rom settings.  I left the orthosis on with the knee unlocked at this time.  Patient to follow with office as needed.  Aaron WERNER.

## 2022-08-17 NOTE — PLAN OF CARE
Patient vital signs are at baseline: Yes  Patient able to ambulate as they were prior to admission or with assist devices provided by therapies during their stay:  Yes  Patient MUST void prior to discharge:  Yes  Patient able to tolerate oral intake:  Yes  Pain has adequate pain control using Oral analgesics:  Yes  Does patient have an identified :  Yes  Has goal D/C date and time been discussed with patient:  Yes    Pt is alert and oriented x4, assist of 2 for transfers, pt requested prn [facility] for L knee pain, 3 island dressings intact, no drainage. Assist of 2 for transfers, voided adequate amount. Plan to discharge to TCU today.

## 2022-08-17 NOTE — PROGRESS NOTES
Care Management Follow Up    Length of Stay (days): 3    Expected Discharge Date: 08/17/2022     Concerns to be Addressed: all concerns addressed in this encounter     Patient plan of care discussed at interdisciplinary rounds: Yes    Anticipated Discharge Disposition: Skilled Nursing Facility     Anticipated Discharge Services: None  Anticipated Discharge DME: None    Patient/family educated on Medicare website which has current facility and service quality ratings: yes  Education Provided on the Discharge Plan:    Patient/Family in Agreement with the Plan: yes    Referrals Placed by CM/SW:    Private pay costs discussed: Not applicable    Additional Information:    Updated that pt will not be ready for discharge today. Spoke with [facility] who stated they would be able to take pt tomorrow 8/18 at 1200. Plan is for pt to discharge to [facility] at 1200 8/18 with  providing transport.     ДМИТРИЙ Ham, ANGEL  Inpatient Care Coordination  Ortho/Spine Unit  [phone]  Juhi Vega LGSW

## 2022-08-17 NOTE — PLAN OF CARE
"OT: Orders received. Chart reviewed and discussed with care team.  IP OT not indicated as patient is expected to discharge to TCU shortly, per PT note \"CGA, heavy UE support and WBing through R LE to complete\". Defer discharge recommendations to care team and OT to next level of care.  Will complete orders.        "

## 2022-08-17 NOTE — PROGRESS NOTES
Orthopedic Surgery  Jannette Parra  08/17/2022     Admit Date:  8/14/2022  POD: 2 Days Post-Op   Procedure(s):  Left hip cephalomedullary nailing, midshaft femur fracture   PF OA    Alert and oriented.   Patient resting comfortably in bed.    Pain controlled.  Tolerating oral intake.    Denies nausea or vomiting  Denies chest pain or shortness of breath  Continue to complain of sharp knee pain with re-positioning.  Had a cortisone injection 2 weeks ago for PF OA.      Temp:  [97.8  F (36.6  C)-100.2  F (37.9  C)] 97.8  F (36.6  C)  Pulse:  [] 89  Resp:  [16-19] 18  BP: (101-105)/(48-57) 102/51  SpO2:  [94 %] 94 %    Dressing is clean, dry, and intact left thigh  Minimal erythema of the surrounding skin. Mild thigh swelling.   Bilateral calves are soft, non-tender.  Left lower extremity is NVI.  Sensation intact bilateral lower extremities  Patient able to resist dorsi and plantar flexion bilaterally  +Dp pulse    Left knee: moderate effusion  Intermittent sharp anterior knee pain with re-positioning left leg  TTP along medial aspect of patella     Labs:  Recent Labs   Lab Test 08/17/22  0923 08/16/22  0603 08/15/22  0636 08/14/22  1218   WBC 8.4  --  6.4 9.4   HGB 7.7* 8.2* 9.7* 11.4*     --  218 254     Recent Labs   Lab Test 08/15/22  0636   INR 1.00     1. PLAN:   Continue ASA 325mg daily for DVT prophylaxis.     Mobilize with PT/OT    HKB ordered to help stabilize knee   WBAT Left LE with walker    Continue current pain regiment.   Dressings: Keep intact.  Change if >60% saturated or peeling off.    Follow-up: 2 weeks     2. Disposition   Anticipate d/c to TCU possible tomorrow pending PT progression and knee pain.     Rachelle Boothe PA-C

## 2022-08-17 NOTE — PLAN OF CARE
Patient vital signs are at baseline: Yes  Patient able to ambulate as they were prior to admission or with assist devices provided by therapies during their stay:  Yes, use walker, w/c, gait belt 2 assist.  Patient MUST void prior to discharge:  Yes  Patient able to tolerate oral intake:  Yes  Pain has adequate pain control using Oral analgesics:  Yes  Does patient have an identified :  Yes, will go to TCU today  Has goal D/C date and time been discussed with patient:  Yes     Left hip incision has some redness and tenderness around., Pt. Has a lot of pain in her left knee, MD was notified.    /51 (BP Location: Right arm)   Pulse 89   Temp 97.8  F (36.6  C) (Temporal)   Resp 18   SpO2 94%

## 2022-08-17 NOTE — PLAN OF CARE
Goal Outcome Evaluation:      Patient vital signs are at baseline: No,  Reason:  Low temp of 100.2 trending down with tylenol   Patient able to ambulate as they were prior to admission or with assist devices provided by therapies during their stay:  No,  Reason:  Transferred better with Pain medication prior to ambulation   Patient MUST void prior to discharge:  Yes  Patient able to tolerate oral intake:  Yes  Pain has adequate pain control using Oral analgesics:  Yes  Does patient have an identified :  Yes  Has goal D/C date and time been discussed with patient:  Yes[facility]      Pt stated feeling warm at 2100, temp taken before Norco admin; temp 100.2 - reassessed approx. 1 hour later for a temp of 99.6. Pt had no other complaints with warmth symptom. Continue to monitor VS.     Pt was reluctant to take any pain medications because whilst in bed no pain was experienced. However, pain was deterring pt from trying to ambulate more than to the commode. Norco administered, 1 hour later pt was standing, taking steps, and walked to the bathroom doorway. Pt educated on the importance of mobility and pain management. 2 assist still utilized without the use of immobilizer per pt request. No knee buckling occurred.

## 2022-08-18 VITALS
OXYGEN SATURATION: 96 % | TEMPERATURE: 97.8 F | HEART RATE: 97 BPM | DIASTOLIC BLOOD PRESSURE: 53 MMHG | SYSTOLIC BLOOD PRESSURE: 115 MMHG | RESPIRATION RATE: 16 BRPM

## 2022-08-18 LAB
ANION GAP SERPL CALCULATED.3IONS-SCNC: 4 MMOL/L (ref 7–15)
BUN SERPL-MCNC: 10.3 MG/DL (ref 8–23)
CALCIUM SERPL-MCNC: 9.1 MG/DL (ref 8.8–10.2)
CHLORIDE SERPL-SCNC: 104 MMOL/L (ref 98–107)
CREAT SERPL-MCNC: 0.54 MG/DL (ref 0.51–0.95)
DEPRECATED HCO3 PLAS-SCNC: 28 MMOL/L (ref 22–29)
ERYTHROCYTE [DISTWIDTH] IN BLOOD BY AUTOMATED COUNT: 14 % (ref 10–15)
GFR SERPL CREATININE-BSD FRML MDRD: >90 ML/MIN/1.73M2
GLUCOSE SERPL-MCNC: 101 MG/DL (ref 70–99)
HCT VFR BLD AUTO: 25.1 % (ref 35–47)
HGB BLD-MCNC: 7.6 G/DL (ref 11.7–15.7)
MCH RBC QN AUTO: 29.9 PG (ref 26.5–33)
MCHC RBC AUTO-ENTMCNC: 30.3 G/DL (ref 31.5–36.5)
MCV RBC AUTO: 99 FL (ref 78–100)
PLATELET # BLD AUTO: 224 10E3/UL (ref 150–450)
POTASSIUM SERPL-SCNC: 4.1 MMOL/L (ref 3.4–5.3)
RBC # BLD AUTO: 2.54 10E6/UL (ref 3.8–5.2)
SODIUM SERPL-SCNC: 136 MMOL/L (ref 136–145)
WBC # BLD AUTO: 7.4 10E3/UL (ref 4–11)

## 2022-08-18 PROCEDURE — 36415 COLL VENOUS BLD VENIPUNCTURE: CPT | Performed by: HOSPITALIST

## 2022-08-18 PROCEDURE — 250N000013 HC RX MED GY IP 250 OP 250 PS 637: Performed by: INTERNAL MEDICINE

## 2022-08-18 PROCEDURE — 80048 BASIC METABOLIC PNL TOTAL CA: CPT | Performed by: HOSPITALIST

## 2022-08-18 PROCEDURE — 99239 HOSP IP/OBS DSCHRG MGMT >30: CPT | Performed by: STUDENT IN AN ORGANIZED HEALTH CARE EDUCATION/TRAINING PROGRAM

## 2022-08-18 PROCEDURE — 250N000013 HC RX MED GY IP 250 OP 250 PS 637: Performed by: HOSPITALIST

## 2022-08-18 PROCEDURE — 250N000013 HC RX MED GY IP 250 OP 250 PS 637: Performed by: PHYSICIAN ASSISTANT

## 2022-08-18 PROCEDURE — 85027 COMPLETE CBC AUTOMATED: CPT | Performed by: HOSPITALIST

## 2022-08-18 PROCEDURE — 250N000011 HC RX IP 250 OP 636: Performed by: PHYSICIAN ASSISTANT

## 2022-08-18 RX ADMIN — GABAPENTIN 600 MG: 300 CAPSULE ORAL at 09:33

## 2022-08-18 RX ADMIN — HYDROCODONE BITARTRATE AND ACETAMINOPHEN 1 TABLET: 7.5; 325 TABLET ORAL at 11:45

## 2022-08-18 RX ADMIN — ASPIRIN 325 MG: 325 TABLET, COATED ORAL at 09:33

## 2022-08-18 RX ADMIN — DOCUSATE SODIUM 100 MG: 100 CAPSULE, LIQUID FILLED ORAL at 09:33

## 2022-08-18 RX ADMIN — FAMOTIDINE 20 MG: 10 INJECTION, SOLUTION INTRAVENOUS at 05:41

## 2022-08-18 RX ADMIN — POLYETHYLENE GLYCOL 3350 17 G: 17 POWDER, FOR SOLUTION ORAL at 09:33

## 2022-08-18 RX ADMIN — HYDROCODONE BITARTRATE AND ACETAMINOPHEN 1 TABLET: 7.5; 325 TABLET ORAL at 05:41

## 2022-08-18 ASSESSMENT — ACTIVITIES OF DAILY LIVING (ADL)
ADLS_ACUITY_SCORE: 26

## 2022-08-18 NOTE — DISCHARGE SUMMARY
[facility]  Hospitalist Discharge Summary      Date of Admission:  8/14/2022  Date of Discharge:  8/18/2022  Discharging Provider: Jose Luis Allison MD  Discharge Service: Hospitalist Service    Discharge Diagnoses           Left femur fracture  Mechanical fall    Follow-ups Needed After Discharge   Follow-up Appointments     Follow Up and recommended labs and tests      Follow up with Dr. Schmitz/Mariela HARRY at [facility] 2 weeks after surgery.  If   still in TCU, can be seen by the orthopedic provider at the care facility   (if this is an option).    Call the care coordinator at [phone] to arrange the appt.   [facility]: [phone]  [facility]: [phone]             Unresulted Labs Ordered in the Past 30 Days of this Admission     No orders found from 7/15/2022 to 8/15/2022.        Discharge Disposition     Discharged to short-term care facility  Condition at discharge: Stable    Hospital Course      Jannette Parra is a 72 year old lady with macular degeneration, rheumatoid arthritis, osteoporosis, history of brain abscess who came to [facility] 8/14/2022 after she tripped and fell backwards in her garden and was found to have L leg fracture         Left femur fracture  Mechanical fall  -X-ray showed oblique fracture at the medial midshaft of the left femur  -S/p left cephalomedullary nailing of the midshaft of femur by Dr. Armand Schmitz MD 8/15/2022  -cont  per ortho     L knee pain  -acute on chronic issue, had joint injection 2 weeks ago as outpt  -CT done and shows joint effusion, no significant hematoma present  -seen by ortho, recommending HKB for knee stabilization     Constipation  -2/2 pain meds, immobilization, etc     Acute blood loss anemia w/dilutional component  -multifactorial from surgery, fluids, etc  -No active bleeding so ok to cont   -fluids stopped and downtrend has slowed    HX Rheumatoid arthritis, Osteoporosis, Macular degeneration,  History of brain abscess  Hypercalcemia   -cont home meds    Consultations This Hospital Stay   CARE MANAGEMENT / SOCIAL WORK IP CONSULT  OCCUPATIONAL THERAPY ADULT IP CONSULT  PHYSICAL THERAPY ADULT IP CONSULT  ORTHOPEDIC SURGERY IP CONSULT  PHYSICAL THERAPY ADULT IP CONSULT  OCCUPATIONAL THERAPY ADULT IP CONSULT  PHYSICAL THERAPY ADULT IP CONSULT  OCCUPATIONAL THERAPY ADULT IP CONSULT  ORTHOSIS EXTREMITY LOWER REFERRAL IP CONSULT    Code Status   Full Code    Time Spent on this Encounter   I, Jose Luis Allison MD, personally saw the patient today and spent greater than 30 minutes discharging this patient.       Jose Luis Allison MD  [facility] ORTHO SPINE  [address] [facility] [address]  Phone: [phone]  Fax: [phone]  ______________________________________________________________________    Physical Exam   Vital Signs: Temp: 97.8  F (36.6  C) Temp src: Temporal BP: 115/53 Pulse: 97   Resp: 16 SpO2: 96 % O2 Device: None (Room air)    Weight: 0 lbs 0 oz       Primary Care Physician   Hellen Hunt    Discharge Orders      General info for SNF    Length of Stay Estimate: Short Term Care: Estimated # of Days <30  Condition at Discharge: Improving  Level of care:skilled   Rehabilitation Potential: Good  Admission H&P remains valid and up-to-date: Yes  Recent Chemotherapy: N/A  Use Nursing Home Standing Orders: Yes     Mantoux instructions    Give two-step Mantoux (PPD) Per Facility Policy Yes     Reason for your hospital stay    Left mid-shaft femur fracture: IM nail     Wound care (specify)    Site:   Left femur   Instructions:  Keep dressings clean, dry and intact.  Change if peeling off or >60% saturated.  Do not immerse.  Ok to shower over tegaderm and aquacel dressings.     Follow Up and recommended labs and tests    Follow up with Dr. Schmitz/Mariela HARRY at [facility] 2 weeks after surgery.  If still in TCU, can be seen by the orthopedic provider at the care facility (if this is an  option).    Call the care coordinator at [phone] to arrange the appt.   [facility]: [phone]  [facility]: [phone]     Activity - Up with assistive device    Up with walker/assist     Weight bearing status    WBAT Left LE with walker     Full Code     Physical Therapy Adult Consult    Evaluate and treat as clinically indicated.    Reason:  Left mid-shaft femur fracture: IM nail     Occupational Therapy Adult Consult    Evaluate and treat as clinically indicated.    Reason:  Left mid-shaft femur fracture: IM nail     Fall precautions     Crutches DME    DME Documentation: Describe the reason for need to support medical necessity: Impaired gait status post hip surgery. I, the undersigned, certify that the above prescribed supplies are medically necessary for this patient and is both reasonable and necessary in reference to accepted standards of medical practice in the treatment of this patient's condition and is not prescribed as a convenience.     Cane DME    DME Documentation: Describe the reason for need to support medical necessity: Impaired gait status post hip surgery. I, the undersigned, certify that the above prescribed supplies are medically necessary for this patient and is both reasonable and necessary in reference to accepted standards of medical practice in the treatment of this patient's condition and is not prescribed as a convenience.     Walker DME    : DME Documentation: Describe the reason for need to support medical necessity: Impaired gait status post hip surgery. I, the undersigned, certify that the above prescribed supplies are medically necessary for this patient and is both reasonable and necessary in reference to accepted standards of medical practice in the treatment of this patient's condition and is not prescribed as a convenience.     Diet    Follow this diet upon discharge: Orders Placed This Encounter      Advance Diet as Tolerated: Regular Diet Adult       Significant  Results and Procedures   Most Recent 3 CBC's:Recent Labs   Lab Test 08/18/22  0615 08/17/22  0923 08/16/22  0603 08/15/22  0636   WBC 7.4 8.4  --  6.4   HGB 7.6* 7.7* 8.2* 9.7*   MCV 99 101*  --  101*    202  --  218     Most Recent 3 BMP's:Recent Labs   Lab Test 08/18/22  0615 08/17/22  0923 08/16/22  0603 08/15/22  0636    137  --  138   POTASSIUM 4.1 3.7  --  4.2   CHLORIDE 104 103  --  106   CO2 28 27  --  27   BUN 10.3 8.5  --  9.3   CR 0.54 0.53  --  0.52   ANIONGAP 4* 7  --  5*   JOSEPH 9.1 9.0  --  9.2   * 181* 122* 103*     Most Recent 2 LFT's:Recent Labs   Lab Test 06/05/18  0000 03/12/18  0000   AST 35* 24   ALT 35 20     Most Recent 3 INR's:Recent Labs   Lab Test 08/15/22  0636   INR 1.00   ,   Results for orders placed or performed during the hospital encounter of 08/14/22   XR Femur Left 2 Views    Narrative    EXAM: XR FEMUR LEFT 2 VIEWS  LOCATION: [facility]  DATE/TIME: 8/14/2022 12:42 PM    INDICATION: fall, mid upper leg pain (in traction)  COMPARISON: Pelvis from today.      Impression    IMPRESSION: Oblique fracture of the mid femoral diaphysis. There is posterior displacement and proximal migration of the distal femur. No angulation. Traction device is in place.   XR Pelvis 1/2 Views    Narrative    EXAM: XR PELVIS 1/2 VIEWS  LOCATION: [facility]  DATE/TIME: 8/14/2022 12:42 PM    INDICATION: Left leg pain following fall.  COMPARISON: Left femur from today.      Impression    IMPRESSION: No proximal left femoral fracture or pelvic fracture. Cannulated screw fixation across an old healed right femoral neck fracture. Traction device is in place.   XR Surgery JESÚS L/T 5 Min Fluoro w Stills    Narrative    This exam was marked as non-reportable because it will not be read by a   radiologist or a [place] non-radiologist provider.         CT Knee Left w/o Contrast    Narrative    CT KNEE LEFT WITHOUT CONTRAST   8/17/2022 11:20 AM      HISTORY: Severe knee pain, history of osteoarthritis but now status  post left femur surgery. Question arthritis vs. Hematoma or other.    COMPARISON: None.    TECHNIQUE: Axial thin section CT images of the knee with 2-D  reformats. Dose reduction techniques utilized.    FINDINGS:    Bones and joints: Partially imaged hardware in the distal femur.  Moderate knee joint effusion. No popliteal cyst. Mild tricompartmental  osteoarthrosis. Chondrocalcinosis in the medial and lateral menisci  and in the lateral tibial plateau cartilage. There are calcified  intra-articular bodies in the popliteus sheath.    Muscles and tendons: No muscle atrophy. The extensor mechanism is  intact.    Soft tissues: No hematoma or mass. Mild edema in the soft tissues  diffusely. No popliteal cyst.      Impression    IMPRESSION:  1. Mild tricompartmental osteoarthrosis of the knee. Moderate knee  joint effusion. Chondrocalcinosis. Intra-articular bodies.  2. Mild soft tissue edema about the knee. No hematoma, cyst or mass.    JOEY FARAH MD         SYSTEM ID:  [id number]       Discharge Medications   Current Discharge Medication List      START taking these medications    Details   aspirin (ASA) 325 MG EC tablet Take 1 tablet (325 mg) by mouth daily  Qty: 30 tablet, Refills: 0    Associated Diagnoses: Closed displaced transverse fracture of shaft of left femur, initial encounter (H)      docusate sodium (COLACE) 100 MG capsule Take 1 capsule (100 mg) by mouth 2 times daily as needed for constipation  Qty: 20 capsule    Associated Diagnoses: Closed displaced transverse fracture of shaft of left femur, initial encounter (H)      HYDROcodone-acetaminophen (NORCO) 7.5-325 MG per tablet Take 1 tablet by mouth every 6 hours as needed for moderate to severe pain  Qty: 25 tablet, Refills: 0    Associated Diagnoses: Closed displaced transverse fracture of shaft of left femur, initial encounter (H)         CONTINUE these medications which  have NOT CHANGED    Details   acetaminophen (TYLENOL 8 HOUR ARTHRITIS PAIN) 650 MG CR tablet Take 1,300 mg by mouth every morning      CRANBERRY PO Take 1 tablet by mouth every evening      DULoxetine (CYMBALTA) 60 MG capsule Take 60 mg by mouth every evening      ferrous sulfate (FE TABS) 325 (65 Fe) MG EC tablet Take 325 mg by mouth every other day Take in the evening      FOLIC ACID 1 MG OR TABS Take 1 mg by mouth every evening      gabapentin (NEURONTIN) 300 MG capsule Take 600 mg by mouth 2 times daily      METHOTREXATE (ARTHRITIS) TABS 2.5 MG OR Take 25 mg by mouth once a week Take on Monday      Multiple Vitamins-Minerals (ICAPS AREDS FORMULA PO) Take 1 capsule by mouth 2 times daily      MULTIVITAMINS OR TABS Take 1 tablet by mouth daily      Omega-3 Fatty Acids (FISH OIL PO) Take 1 capsule by mouth 2 times daily      sulfaSALAzine (AZULFIDINE) 500 MG tablet Take 1,000 mg by mouth 2 times daily      Zoledronic Acid (RECLAST IV) Receive two times a year         STOP taking these medications       meloxicam (MOBIC) 15 MG tablet Comments:   Reason for Stopping:             Allergies   Allergies   Allergen Reactions     No Known Drug Allergies      Nafcillin Rash

## 2022-08-18 NOTE — PLAN OF CARE
Goal Outcome Evaluation:    Patient vital signs are at baseline: Yes  Patient able to ambulate as they were prior to admission or with assist devices provided by therapies during their stay:  Yes, A1 with walker/GB and hinge brace to left leg when ambulating  Patient MUST void prior to discharge:  Yes  Patient able to tolerate oral intake:  Yes, regular diet  Pain has adequate pain control using Oral analgesics:  Yes, PRN norco, see MAR  Does patient have an identified :  Yes, plan to discharge to TCU  Has goal D/C date and time been discussed with patient:  Yes    Pt A/O x4. CMS intact. Dressings to left hip CDI.  to transport pt to [facility] today at noon. Will continue to monitor.

## 2022-08-18 NOTE — PROGRESS NOTES
----- Message from Hanh Riddle, MLS sent at 1/15/2019  5:29 PM CST -----  Regarding: LAB ORDERS NEED RELEASED  URINE ORDER NEEDS TO BE RELEASED THANK YOU   Care Management Discharge Note    Discharge Date: 08/18/2022       Discharge Disposition: Skilled Nursing Facility    Discharge Services: None    Discharge DME: None    Discharge Transportation: family or friend will provide    Private pay costs discussed: private room/amenity fees    PAS Confirmation Code: [id number]  Patient/family educated on Medicare website which has current facility and service quality ratings: yes    Education Provided on the Discharge Plan:    Persons Notified of Discharge Plans: Pt, ANTONY, MD, Facility  Patient/Family in Agreement with the Plan: yes    Handoff Referral Completed: No    Additional Information:    Pt will discharge to [facility] at 1200 today 8/18 with spouse providing transport. PAS completed. Orders faxed. Left  for facility confirming that pt will be heading to the facility at 1200.     ДМИТРИЙ Ham, BLADIMIR  Inpatient Care Coordination  Ortho/Spine Unit  [phone]  Juhi Vega, BLADIMIR

## 2022-08-18 NOTE — PLAN OF CARE
Goal Outcome Evaluation:    Reviewed discharge instructions and medications with patient and spouse. Questions answered. Patient discharged to [facility] with  transporting, discharge instructions, and belongings.

## 2022-08-19 ENCOUNTER — PATIENT OUTREACH (OUTPATIENT)
Dept: CARE COORDINATION | Facility: CLINIC | Age: 72
End: 2022-08-19

## 2022-08-19 NOTE — PROGRESS NOTES
[facility]    Background: Care Coordination referral placed from [facility] discharge report for reason of patient meeting criteria for a TCM outreach call by [facility] team.    Assessment: Upon chart review, [facility] Team member will cancel/close the referral for TCM outreach due to reason below:    Patient is not established within [facility] Primary Care and [facility] team member noted patient discharged to TCU/ARU/LTACH.    Plan: Care Coordination referral for TCM outreach canceled.      ERA Whiting  [facility] [place], [facility]    *[facility] Team does NOT follow patient ongoing. Referrals are identified based on internal discharge reports and the outreach is to ensure patient has an understanding of their discharge instructions.

## (undated) DEVICE — ADH SKIN CLOSURE PREMIERPRO EXOFIN 1.0ML 3470

## (undated) DEVICE — PREP CHLORAPREP 26ML TINTED HI-LITE ORANGE 930815

## (undated) DEVICE — PACK HIP NAILING SOP32HPFC4

## (undated) DEVICE — DRILL BIT QUICK COUPLING 3 FLUTE 4.2MMX145MM NDL  POINT

## (undated) DEVICE — ROD SYN REAMER BALL TIP 2.5X950MM  351.706S

## (undated) DEVICE — LINEN ORTHO ACL PACK 5447

## (undated) DEVICE — WIRE GUIDE 3.2X400MM  357.399

## (undated) DEVICE — BAG CLEAR TRASH 1.3M 39X33" P4040C

## (undated) DEVICE — DRAPE X-RAY TUBE 00-901169-01-OEC

## (undated) DEVICE — SYR 30ML LL W/O NDL 302832

## (undated) DEVICE — ESU GROUND PAD ADULT W/CORD E7507

## (undated) DEVICE — SU VICRYL 0 CT-1 36" J346H

## (undated) DEVICE — GLOVE PROTEXIS BLUE W/NEU-THERA 8.0  2D73EB80

## (undated) DEVICE — SU VICRYL 1 CT-1 27" J341H

## (undated) DEVICE — NDL 22GA 1.5"

## (undated) DEVICE — SU VICRYL 2-0 CT-1 36" UND J945H

## (undated) DEVICE — LINEN FULL SHEET 5511

## (undated) DEVICE — GLOVE PROTEXIS POWDER FREE 8.0 ORTHOPEDIC 2D73ET80

## (undated) DEVICE — DRSG TEGADERM 4X4 3/4" 1626W

## (undated) DEVICE — GLOVE PROTEXIS BLUE W/NEU-THERA 7.5  2D73EB75

## (undated) DEVICE — GLOVE PROTEXIS POWDER FREE 7.5 ORTHOPEDIC 2D73ET75

## (undated) DEVICE — SU MONOCRYL 4-0 PS-2 27" UND Y426H

## (undated) DEVICE — LINEN HALF SHEET 5512

## (undated) RX ORDER — MEPERIDINE HYDROCHLORIDE 25 MG/ML
INJECTION INTRAMUSCULAR; INTRAVENOUS; SUBCUTANEOUS
Status: DISPENSED
Start: 2022-08-15

## (undated) RX ORDER — GLYCOPYRROLATE 0.2 MG/ML
INJECTION INTRAMUSCULAR; INTRAVENOUS
Status: DISPENSED
Start: 2022-08-15

## (undated) RX ORDER — TRANEXAMIC ACID 10 MG/ML
INJECTION, SOLUTION INTRAVENOUS
Status: DISPENSED
Start: 2022-08-15

## (undated) RX ORDER — CEFAZOLIN SODIUM/WATER 2 G/20 ML
SYRINGE (ML) INTRAVENOUS
Status: DISPENSED
Start: 2022-08-15

## (undated) RX ORDER — FENTANYL CITRATE 50 UG/ML
INJECTION, SOLUTION INTRAMUSCULAR; INTRAVENOUS
Status: DISPENSED
Start: 2022-08-15

## (undated) RX ORDER — LIDOCAINE HYDROCHLORIDE 10 MG/ML
INJECTION, SOLUTION EPIDURAL; INFILTRATION; INTRACAUDAL; PERINEURAL
Status: DISPENSED
Start: 2022-08-15

## (undated) RX ORDER — FENTANYL CITRATE-0.9 % NACL/PF 10 MCG/ML
PLASTIC BAG, INJECTION (ML) INTRAVENOUS
Status: DISPENSED
Start: 2022-08-15

## (undated) RX ORDER — BUPIVACAINE HYDROCHLORIDE AND EPINEPHRINE 5; 5 MG/ML; UG/ML
INJECTION, SOLUTION EPIDURAL; INTRACAUDAL; PERINEURAL
Status: DISPENSED
Start: 2022-08-15

## (undated) RX ORDER — DEXAMETHASONE SODIUM PHOSPHATE 4 MG/ML
INJECTION, SOLUTION INTRA-ARTICULAR; INTRALESIONAL; INTRAMUSCULAR; INTRAVENOUS; SOFT TISSUE
Status: DISPENSED
Start: 2022-08-15

## (undated) RX ORDER — PROPOFOL 10 MG/ML
INJECTION, EMULSION INTRAVENOUS
Status: DISPENSED
Start: 2022-08-15

## (undated) RX ORDER — ONDANSETRON 2 MG/ML
INJECTION INTRAMUSCULAR; INTRAVENOUS
Status: DISPENSED
Start: 2022-08-15